# Patient Record
Sex: MALE | Race: WHITE | Employment: PART TIME | URBAN - METROPOLITAN AREA
[De-identification: names, ages, dates, MRNs, and addresses within clinical notes are randomized per-mention and may not be internally consistent; named-entity substitution may affect disease eponyms.]

---

## 2020-12-29 ENCOUNTER — HOSPITAL ENCOUNTER (EMERGENCY)
Age: 50
Discharge: HOME | End: 2020-12-29
Attending: EMERGENCY MEDICINE
Payer: COMMERCIAL

## 2020-12-29 VITALS
HEART RATE: 92 BPM | OXYGEN SATURATION: 96 % | SYSTOLIC BLOOD PRESSURE: 134 MMHG | WEIGHT: 240.08 LBS | TEMPERATURE: 98.6 F | HEIGHT: 71 IN | DIASTOLIC BLOOD PRESSURE: 97 MMHG | BODY MASS INDEX: 33.61 KG/M2 | RESPIRATION RATE: 20 BRPM

## 2020-12-29 PROCEDURE — 6370000000 HC RX 637 (ALT 250 FOR IP): Performed by: EMERGENCY MEDICINE

## 2020-12-29 PROCEDURE — 99283 EMERGENCY DEPT VISIT LOW MDM: CPT

## 2020-12-29 RX ORDER — GLIMEPIRIDE 4 MG/1
4 TABLET ORAL
Qty: 30 TABLET | Refills: 0 | Status: SHIPPED | OUTPATIENT
Start: 2020-12-29 | End: 2021-03-24

## 2020-12-29 RX ORDER — GLIMEPIRIDE 4 MG/1
4 TABLET ORAL ONCE
Status: COMPLETED | OUTPATIENT
Start: 2020-12-29 | End: 2020-12-29

## 2020-12-29 RX ADMIN — GLIMEPIRIDE 4 MG: 4 TABLET ORAL at 22:53

## 2020-12-29 NOTE — LETTER
[facility] Emergency Department  [address] [address]  Phone: [phone]  Fax: [phone]             December 29, 2020    Patient: Jian Rodriguez   YOB: 1970   Date of Visit: 12/29/2020       To Whom It May Concern:    Michele Zapien was seen and treated in our emergency department on 12/29/2020. He may return to work on 12/30/20. If you have any questions, don't hesitate to call. Thank you.        Sincerely,   Sheron ARENAS, RN          Signature:__________________________________

## 2020-12-30 NOTE — ED PROVIDER NOTES
EMERGENCY DEPARTMENT ENCOUNTER    Patient: Abi Mcdonough  MRN: [id number]  : 1970  Date of Evaluation: 2020  ED Provider:  Brandie King    CHIEF COMPLAINT  Chief Complaint   Patient presents with    Medication Refill     states out of glipizide, unable to get into contact with doctor       PATRICIO  Abi Mcdonough is a 45 y.o. male who presents stating he is out of his glimepiride. It had originally been listed in the triage note as glipizide however the patient states his glimepiride and states that is 4 mg. He does not have a written down with him however. We do not have any record of this medication either. He states he ran out of it 5 days ago. He states his primary care doctor is out of office for the week and he is unable to get the med refilled. He denies any symptoms at this present time. Denies any other associated symptoms or complaints or concerns.       REVIEW OF SYSTEMS    Constitutional: negative for fever, chills  Neurological: negative for HA, focal weakness, loss of sensation  Ophthalmic: negative for vision change  ENT: negative for congestion, rhinorrhea  Cardiovascular: negative for chest pain  Respiratory: negative for SOB, cough  GI: negative for abdominal pain, nausea, vomiting, diarrhea, constipation  : negative for dysuria, hematuria  Musculoskeletal: negative for myalgias, decreased ROM, joint swelling  Dermatological: negative for rash, wounds  Heme: Negative for bleeding, bruising      PAST MEDICAL HISTORY  Past Medical History:   Diagnosis Date    Diabetes ([facility] [address]     Thyroid disease     scheduled for thyroid bx  12r/t(  has a mass right side of neck)       CURRENT MEDICATIONS  [unfilled]    ALLERGIES  No Known Allergies    SURGICAL HISTORY  Past Surgical History:   Procedure Laterality Date    DENTAL SURGERY         FAMILY HISTORY  Family History   Problem Relation Age of Onset    Cancer Father         lung ca    Diabetes Sister     Blindness Sister  Heart Disease Mother     Diabetes Mother     Diabetes Brother        SOCIAL HISTORY  Social History     Socioeconomic History    Marital status: Single     Spouse name: None    Number of children: None    Years of education: None    Highest education level: None   Occupational History    None   Social Needs    Financial resource strain: None    Food insecurity     Worry: None     Inability: None    Transportation needs     Medical: None     Non-medical: None   Tobacco Use    Smoking status: Current Some Day Smoker     Packs/day: 0.50     Years: 23.00     Pack years: 11.50     Types: Cigarettes    Smokeless tobacco: Never Used   Substance and Sexual Activity    Alcohol use: No    Drug use: No     Comment: occ use of marijuana-last used as a teenager    Sexual activity: None   Lifestyle    Physical activity     Days per week: None     Minutes per session: None    Stress: None   Relationships    Social connections     Talks on phone: None     Gets together: None     Attends Restoration service: None     Active member of club or organization: None     Attends meetings of clubs or organizations: None     Relationship status: None    Intimate partner violence     Fear of current or ex partner: None     Emotionally abused: None     Physically abused: None     Forced sexual activity: None   Other Topics Concern    None   Social History Narrative    None         **Past medical, family and social histories, and nursing notes reviewed and verified by me**      PHYSICAL EXAM  VITAL SIGNS:   ED Triage Vitals [12/29/20 2134]   Enc Vitals Group      BP (!) 134/97      Pulse 92      Resp 20      Temp 98.6 °F (37 °C)      Temp Source Oral      SpO2 96 %      Weight       Height       Head Circumference       Peak Flow       Pain Score       Pain Loc       Pain Edu? Excl. in [address]? Vitals during ED course were reviewed and are as charted.     Constitutional: Minimal distress, Non-toxic appearance  Eyes: Conjunctiva normal, No discharge  HENT: Normocephalic, Atraumatic  Neck: Supple, no stridor, no grossly visible or palpable masses  Cardiovascular: Regular rate and rhythm, No murmurs, No rubs, No gallops  Pulmonary/Chest: Normal breath sounds, No respiratory distress or accessory muscle use, No wheezing, crackles or rhonchi. Abdomen: Soft, nondistended and nonrigid, No tenderness or peritoneal signs, No masses  Skin: Warm, Dry, No erythema, No rash, No cyanosis, No mottling  Psychiatric: Alert and oriented x3, Affect normal          RADIOLOGY/PROCEDURES/LABS/MEDICATIONS ADMINISTERED:    I have reviewed and interpreted all of the currently available lab results from this visit (if applicable):  No results found for this visit on 12/29/20. ABNORMAL LABS:  Labs Reviewed - No data to display      IMAGING STUDIES ORDERED:  None      No orders to display         MEDICATIONS ADMINISTERED:  Medications   glimepiride (AMARYL) tablet 4 mg (has no administration in time range)         COURSE & MEDICAL DECISION MAKING  Last vitals: BP (!) 134/97   Pulse 92   Temp 98.6 °F (37 °C) (Oral)   Resp 20   [id number]     70-year-old male who presents for refill of his oral antihyperglycemic medication. He did have some initial confusion as to which medication he is on however did later state that it was glimepiride 4 mg. Unfortunately did not have any records to confirm this. He denies any symptoms at present time. I will give him a short prescription for this and I have advised that he follow-up with his primary care provider next week. Additional workup and treatment in the ED as documented above. Patient reassured and will be discharged home. I have explained to the patient in appropriate terminology our work-up in the ED and their diagnosis. I have also given anticipatory guidance and expectant management of their condition as an outpatient as per my custom.  The patient was given clear discharge and follow-up instructions including return to the ER immediately for worsening concerns. The patient has been advised to follow-up with their primary care physician and/or referred physician in the next two to three days or sooner if worsening and to return to the ER immediately as above with any concerns. I provided the patient counseling with regard to my customary list of strict return precautions as well as return precautions specific to the cause for today's emergency department visit. The patient will return under these provided conditions, but should also return for new concerns or further worsening. Pt and/or family understand and agree with plan. Clinical Impression:  1. Encounter for medication refill    2. Type 2 diabetes mellitus without complication, unspecified whether long term insulin use ([facility] [address]        Disposition referral (if applicable):  Sharyle Meyers, MD  [address] [phone]    Schedule an appointment as soon as possible for a visit       [facility] Emergency Department  [address] [phone]  [phone]    If symptoms worsen      Disposition medications (if applicable):  New Prescriptions    GLIMEPIRIDE (AMARYL) 4 MG TABLET    Take 1 tablet by mouth every morning (before breakfast)       ED Provider Disposition Time  DISPOSITION Decision To Discharge 12/29/2020 10:15:43 PM          Electronically signed by: Xochilt Guzman M.D., 12/29/2020 10:20 PM      This dictation was created with voice recognition software. While attempts have been made to review the dictation as it is transcribed, on occasion the spoken word can be misinterpreted by the technology leading to omissions or inappropriate words, phrases or sentences.         Prashant Hussein MD  12/30/20 0664

## 2020-12-30 NOTE — ED NOTES
Patient medicated per orders at this time. Patient discharged to home at this time. Discharge instructions and follow up care discussed, patient voices understanding.       Fadia Bullock RN  12/29/20 4195

## 2021-03-24 ENCOUNTER — HOSPITAL ENCOUNTER (EMERGENCY)
Age: 51
Discharge: HOME | End: 2021-03-24
Attending: EMERGENCY MEDICINE
Payer: COMMERCIAL

## 2021-03-24 VITALS
HEART RATE: 104 BPM | WEIGHT: 255 LBS | BODY MASS INDEX: 35.7 KG/M2 | RESPIRATION RATE: 16 BRPM | DIASTOLIC BLOOD PRESSURE: 95 MMHG | OXYGEN SATURATION: 96 % | HEIGHT: 71 IN | SYSTOLIC BLOOD PRESSURE: 118 MMHG | TEMPERATURE: 98 F

## 2021-03-24 DIAGNOSIS — Z76.0 ENCOUNTER FOR MEDICATION REFILL: Primary | ICD-10-CM

## 2021-03-24 PROCEDURE — 99283 EMERGENCY DEPT VISIT LOW MDM: CPT

## 2021-03-24 RX ORDER — GLYBURIDE 2.5 MG/1
2.5 TABLET ORAL
Qty: 30 TABLET | Refills: 0 | Status: ON HOLD | OUTPATIENT
Start: 2021-03-24 | End: 2021-07-06 | Stop reason: SDUPTHER

## 2021-03-25 NOTE — ED NOTES
Dr Susy Alva saw patient in triage. Patient being registered and then will dc patient.       Rosena Schirmer, RN  03/24/21 3422

## 2021-03-26 NOTE — ED PROVIDER NOTES
Emergency Department Encounter    Patient: Dipti Patel  MRN: [id number]  : 1970  Date of Evaluation: 3/26/2021  ED Provider:  Juan Alberto Patient    Triage Chief Complaint:   Medication Refill (out of glyburide 2.5mg, having extreme thirst and urination )    [place]:  Dipti Patel is a 45 y.o. male that presents medication refill, he is out of his glyburide, having increased thirst due to that, sugars have been running a little high in the 200s but not extremely high.     ROS - see HPI, below listed is current ROS at time of my eval:  General:  No fevers, no chills  Eyes:  No recent vison changes, no discharge  ENT:  No sore throat, no nasal congestion  Cardiovascular:  No chest pain, no palpitations  Respiratory:  No shortness of breath, no cough  Gastrointestinal:  No pain, no nausea, no vomiting  Musculoskeletal:  No muscle pain, no joint pain  Neurologic:  No speech problems, no headache, no extremity weakness  Psychiatric:  No anxiety  Extremities:  no edema, no pain    Past Medical History:   Diagnosis Date    Diabetes ([facility] [address]     Thyroid disease     scheduled for thyroid bx  12r/t(  has a mass right side of neck)     Past Surgical History:   Procedure Laterality Date    DENTAL SURGERY       Family History   Problem Relation Age of Onset    Cancer Father         lung ca    Diabetes Sister     Blindness Sister     Heart Disease Mother     Diabetes Mother     Diabetes Brother      Social History     Socioeconomic History    Marital status: Single     Spouse name: Not on file    Number of children: Not on file    Years of education: Not on file    Highest education level: Not on file   Occupational History    Not on file   Social Needs    Financial resource strain: Not on file    Food insecurity     Worry: Not on file     Inability: Not on file    Transportation needs     Medical: Not on file     Non-medical: Not on file   Tobacco Use    Smoking status: Current Some Day Smoker Packs/day: 0.50     Years: 23.00     Pack years: 11.50     Types: Cigarettes    Smokeless tobacco: Never Used   Substance and Sexual Activity    Alcohol use: No    Drug use: No     Comment: occ use of marijuana-last used as a teenager    Sexual activity: Not on file   Lifestyle    Physical activity     Days per week: Not on file     Minutes per session: Not on file    Stress: Not on file   Relationships    Social connections     Talks on phone: Not on file     Gets together: Not on file     Attends Buddhist service: Not on file     Active member of club or organization: Not on file     Attends meetings of clubs or organizations: Not on file     Relationship status: Not on file    Intimate partner violence     Fear of current or ex partner: Not on file     Emotionally abused: Not on file     Physically abused: Not on file     Forced sexual activity: Not on file   Other Topics Concern    Not on file   Social History Narrative    Not on file     No current facility-administered medications for this encounter. Current Outpatient Medications   Medication Sig Dispense Refill    glyBURIDE (DIABETA) 2.5 MG tablet Take 1 tablet by mouth daily (with breakfast) 30 tablet 0    metFORMIN (GLUCOPHAGE) 500 MG tablet Take 1 tablet by mouth 2 times daily (with meals) 30 tablet 0     No Known Allergies    Nursing Notes Reviewed    Physical Exam:  Triage VS:    ED Triage Vitals [03/24/21 2144]   Enc Vitals Group      BP (!) 118/95      Pulse 104      Resp 16      Temp 98 °F (36.7 °C)      Temp Source Oral      SpO2 96 %      Weight 255 lb (115.7 kg)      Height 5' 11\" (1.803 m)      Head Circumference       Peak Flow       Pain Score       Pain Loc       Pain Edu? Excl. in [address]? My pulse ox interpretation is  normal    General appearance:  No acute distress. Eye:  Extraocular movements intact. Ears, nose, mouth and throat:  Oral mucosa moist   Neck:  Trachea midline.    Extremity:  Normal ROM Heart:  Regular  Perfusion:  intact  Respiratory:   Respirations nonlabored. Abdominal: Non distended. Neurological:  Alert and oriented    MDM:  Patient presented to the emergency department for medication refill, patient is not having any symptoms related to being off the medications, and has no acute complaints. At this point prescriptions will be provided, I did recommend finding an outpatient primary care physician which patient is working on, outpatient follow-up instructions given, return warnings given. Clinical Impression:  1. Encounter for medication refill      Disposition referral (if applicable):  [facility] [place]  [address] [address] [facility]  [phone]  In 2 days      Disposition medications (if applicable):  Discharge Medication List as of 3/24/2021 10:02 PM      START taking these medications    Details   glyBURIDE (DIABETA) 2.5 MG tablet Take 1 tablet by mouth daily (with breakfast), Disp-30 tablet, R-0Normal           ED Provider Disposition Time  DISPOSITION Decision To Discharge 03/24/2021 09:53:17 PM      Comment: Please note this report has been produced using speech recognition software and may contain errors related to that system including errors in grammar, punctuation, and spelling, as well as words and phrases that may be inappropriate. Efforts were made to edit the dictations.        David Lopez MD  03/26/21 2314

## 2021-07-04 ENCOUNTER — HOSPITAL ENCOUNTER (INPATIENT)
Age: 51
LOS: 1 days | Discharge: HOME | DRG: 639 | End: 2021-07-06
Attending: STUDENT IN AN ORGANIZED HEALTH CARE EDUCATION/TRAINING PROGRAM | Admitting: STUDENT IN AN ORGANIZED HEALTH CARE EDUCATION/TRAINING PROGRAM
Payer: COMMERCIAL

## 2021-07-04 DIAGNOSIS — E11.65 TYPE 2 DIABETES MELLITUS WITH HYPERGLYCEMIA, WITHOUT LONG-TERM CURRENT USE OF INSULIN (HCC): Primary | ICD-10-CM

## 2021-07-04 LAB
ALBUMIN SERPL-MCNC: 4.2 GM/DL (ref 3.4–5)
ALP BLD-CCNC: 153 IU/L (ref 40–129)
ALT SERPL-CCNC: 59 U/L (ref 10–40)
ANION GAP SERPL CALCULATED.3IONS-SCNC: 15 MMOL/L (ref 4–16)
AST SERPL-CCNC: 26 IU/L (ref 15–37)
BASE EXCESS: 4 (ref 0–3.3)
BASOPHILS ABSOLUTE: 0 K/CU MM
BASOPHILS RELATIVE PERCENT: 0.6 % (ref 0–1)
BETA-HYDROXYBUTYRATE: 1.7 MG/DL (ref 0–3)
BILIRUB SERPL-MCNC: 0.6 MG/DL (ref 0–1)
BUN BLDV-MCNC: 13 MG/DL (ref 6–23)
CALCIUM SERPL-MCNC: 8.8 MG/DL (ref 8.3–10.6)
CHLORIDE BLD-SCNC: 86 MMOL/L (ref 99–110)
CHP ED QC CHECK: YES
CO2: 20 MMOL/L (ref 21–32)
COMMENT: ABNORMAL
CREAT SERPL-MCNC: 0.8 MG/DL (ref 0.9–1.3)
DIFFERENTIAL TYPE: ABNORMAL
EOSINOPHILS ABSOLUTE: 0.1 K/CU MM
EOSINOPHILS RELATIVE PERCENT: 1.6 % (ref 0–3)
GFR AFRICAN AMERICAN: >60 ML/MIN/1.73M2
GFR NON-AFRICAN AMERICAN: >60 ML/MIN/1.73M2
GLUCOSE BLD-MCNC: 847 MG/DL (ref 70–99)
GLUCOSE BLD-MCNC: >600 MG/DL (ref 70–99)
GLUCOSE BLD-MCNC: NORMAL MG/DL
HCO3 VENOUS: 20 MMOL/L (ref 19–25)
HCT VFR BLD CALC: 41.1 % (ref 42–52)
HEMOGLOBIN: 14.8 GM/DL (ref 13.5–18)
IMMATURE NEUTROPHIL %: 0.3 % (ref 0–0.43)
LYMPHOCYTES ABSOLUTE: 1.5 K/CU MM
LYMPHOCYTES RELATIVE PERCENT: 21.8 % (ref 24–44)
MCH RBC QN AUTO: 30.2 PG (ref 27–31)
MCHC RBC AUTO-ENTMCNC: 36 % (ref 32–36)
MCV RBC AUTO: 83.9 FL (ref 78–100)
MONOCYTES ABSOLUTE: 0.3 K/CU MM
MONOCYTES RELATIVE PERCENT: 4.9 % (ref 0–4)
NUCLEATED RBC %: 0 %
O2 SAT, VEN: 94 % (ref 50–70)
PCO2, VEN: 33 MMHG (ref 38–52)
PDW BLD-RTO: 11.9 % (ref 11.7–14.9)
PH VENOUS: 7.39 (ref 7.32–7.42)
PLATELET # BLD: 223 K/CU MM (ref 140–440)
PMV BLD AUTO: 10.2 FL (ref 7.5–11.1)
PO2, VEN: 89 MMHG (ref 28–48)
POTASSIUM SERPL-SCNC: 4.3 MMOL/L (ref 3.5–5.1)
RBC # BLD: 4.9 M/CU MM (ref 4.6–6.2)
SEGMENTED NEUTROPHILS ABSOLUTE COUNT: 4.9 K/CU MM
SEGMENTED NEUTROPHILS RELATIVE PERCENT: 70.8 % (ref 36–66)
SODIUM BLD-SCNC: 121 MMOL/L (ref 135–145)
TOTAL IMMATURE NEUTOROPHIL: 0.02 K/CU MM
TOTAL NUCLEATED RBC: 0 K/CU MM
TOTAL PROTEIN: 6.8 GM/DL (ref 6.4–8.2)
WBC # BLD: 6.9 K/CU MM (ref 4–10.5)

## 2021-07-04 PROCEDURE — 81001 URINALYSIS AUTO W/SCOPE: CPT

## 2021-07-04 PROCEDURE — 2580000003 HC RX 258: Performed by: PHYSICIAN ASSISTANT

## 2021-07-04 PROCEDURE — 85025 COMPLETE CBC W/AUTO DIFF WBC: CPT

## 2021-07-04 PROCEDURE — 96360 HYDRATION IV INFUSION INIT: CPT

## 2021-07-04 PROCEDURE — 82010 KETONE BODYS QUAN: CPT

## 2021-07-04 PROCEDURE — 80053 COMPREHEN METABOLIC PANEL: CPT

## 2021-07-04 PROCEDURE — 99285 EMERGENCY DEPT VISIT HI MDM: CPT

## 2021-07-04 PROCEDURE — 96361 HYDRATE IV INFUSION ADD-ON: CPT

## 2021-07-04 PROCEDURE — 82805 BLOOD GASES W/O2 SATURATION: CPT

## 2021-07-04 RX ORDER — 0.9 % SODIUM CHLORIDE 0.9 %
1000 INTRAVENOUS SOLUTION INTRAVENOUS ONCE
Status: COMPLETED | OUTPATIENT
Start: 2021-07-04 | End: 2021-07-04

## 2021-07-04 RX ORDER — 0.9 % SODIUM CHLORIDE 0.9 %
1000 INTRAVENOUS SOLUTION INTRAVENOUS ONCE
Status: COMPLETED | OUTPATIENT
Start: 2021-07-04 | End: 2021-07-05

## 2021-07-04 RX ADMIN — SODIUM CHLORIDE 1000 ML: 9 INJECTION, SOLUTION INTRAVENOUS at 23:37

## 2021-07-04 RX ADMIN — SODIUM CHLORIDE 1000 ML: 9 INJECTION, SOLUTION INTRAVENOUS at 22:45

## 2021-07-05 PROBLEM — E11.00 HYPEROSMOLAR HYPERGLYCEMIC STATE (HHS) (HCC): Status: ACTIVE | Noted: 2021-07-05

## 2021-07-05 LAB
ANION GAP SERPL CALCULATED.3IONS-SCNC: 11 MMOL/L (ref 4–16)
ANION GAP SERPL CALCULATED.3IONS-SCNC: 12 MMOL/L (ref 4–16)
BACTERIA: NEGATIVE /HPF
BILIRUBIN URINE: NEGATIVE MG/DL
BLOOD, URINE: NEGATIVE
BUN BLDV-MCNC: 10 MG/DL (ref 6–23)
BUN BLDV-MCNC: 10 MG/DL (ref 6–23)
CALCIUM SERPL-MCNC: 8.1 MG/DL (ref 8.3–10.6)
CALCIUM SERPL-MCNC: 8.3 MG/DL (ref 8.3–10.6)
CHLORIDE BLD-SCNC: 103 MMOL/L (ref 99–110)
CHLORIDE BLD-SCNC: 103 MMOL/L (ref 99–110)
CHP ED QC CHECK: NORMAL
CHP ED QC CHECK: NORMAL
CHP ED QC CHECK: YES
CLARITY: CLEAR
CO2: 22 MMOL/L (ref 21–32)
CO2: 23 MMOL/L (ref 21–32)
COLOR: COLORLESS
CREAT SERPL-MCNC: 0.6 MG/DL (ref 0.9–1.3)
CREAT SERPL-MCNC: 0.6 MG/DL (ref 0.9–1.3)
ESTIMATED AVERAGE GLUCOSE: 413 MG/DL
GFR AFRICAN AMERICAN: >60 ML/MIN/1.73M2
GFR AFRICAN AMERICAN: >60 ML/MIN/1.73M2
GFR NON-AFRICAN AMERICAN: >60 ML/MIN/1.73M2
GFR NON-AFRICAN AMERICAN: >60 ML/MIN/1.73M2
GLUCOSE BLD-MCNC: 233 MG/DL
GLUCOSE BLD-MCNC: 233 MG/DL (ref 70–99)
GLUCOSE BLD-MCNC: 249 MG/DL (ref 70–99)
GLUCOSE BLD-MCNC: 250 MG/DL (ref 70–99)
GLUCOSE BLD-MCNC: 252 MG/DL
GLUCOSE BLD-MCNC: 252 MG/DL (ref 70–99)
GLUCOSE BLD-MCNC: 257 MG/DL (ref 70–99)
GLUCOSE BLD-MCNC: 261 MG/DL (ref 70–99)
GLUCOSE BLD-MCNC: 285 MG/DL
GLUCOSE BLD-MCNC: 285 MG/DL
GLUCOSE BLD-MCNC: 285 MG/DL (ref 70–99)
GLUCOSE BLD-MCNC: 299 MG/DL (ref 70–99)
GLUCOSE BLD-MCNC: 317 MG/DL
GLUCOSE BLD-MCNC: 317 MG/DL (ref 70–99)
GLUCOSE BLD-MCNC: 333 MG/DL
GLUCOSE BLD-MCNC: 333 MG/DL (ref 70–99)
GLUCOSE BLD-MCNC: 350 MG/DL (ref 70–99)
GLUCOSE BLD-MCNC: 484 MG/DL
GLUCOSE BLD-MCNC: 484 MG/DL (ref 70–99)
GLUCOSE, URINE: >500 MG/DL
HBA1C MFR BLD: 16 % (ref 4.2–6.3)
HCT VFR BLD CALC: 39.3 % (ref 42–52)
HEMOGLOBIN: 14 GM/DL (ref 13.5–18)
KETONES, URINE: NEGATIVE MG/DL
LEUKOCYTE ESTERASE, URINE: NEGATIVE
MAGNESIUM: 1.7 MG/DL (ref 1.8–2.4)
MCH RBC QN AUTO: 30.2 PG (ref 27–31)
MCHC RBC AUTO-ENTMCNC: 35.6 % (ref 32–36)
MCV RBC AUTO: 84.9 FL (ref 78–100)
MUCUS: ABNORMAL HPF
NITRITE URINE, QUANTITATIVE: NEGATIVE
PDW BLD-RTO: 12.2 % (ref 11.7–14.9)
PH, URINE: 5 (ref 5–8)
PHOSPHORUS: 2.5 MG/DL (ref 2.5–4.9)
PLATELET # BLD: 215 K/CU MM (ref 140–440)
PMV BLD AUTO: 10.4 FL (ref 7.5–11.1)
POTASSIUM SERPL-SCNC: 3.8 MMOL/L (ref 3.5–5.1)
POTASSIUM SERPL-SCNC: 3.8 MMOL/L (ref 3.5–5.1)
PROTEIN UA: NEGATIVE MG/DL
RBC # BLD: 4.63 M/CU MM (ref 4.6–6.2)
RBC URINE: ABNORMAL /HPF (ref 0–3)
SODIUM BLD-SCNC: 137 MMOL/L (ref 135–145)
SODIUM BLD-SCNC: 137 MMOL/L (ref 135–145)
SPECIFIC GRAVITY UA: 1.03 (ref 1–1.03)
SQUAMOUS EPITHELIAL: <1 /HPF
TRICHOMONAS: ABNORMAL /HPF
UROBILINOGEN, URINE: NEGATIVE MG/DL (ref 0.2–1)
WBC # BLD: 6.4 K/CU MM (ref 4–10.5)
WBC UA: ABNORMAL /HPF (ref 0–2)

## 2021-07-05 PROCEDURE — 96361 HYDRATE IV INFUSION ADD-ON: CPT

## 2021-07-05 PROCEDURE — 2580000003 HC RX 258: Performed by: STUDENT IN AN ORGANIZED HEALTH CARE EDUCATION/TRAINING PROGRAM

## 2021-07-05 PROCEDURE — 84100 ASSAY OF PHOSPHORUS: CPT

## 2021-07-05 PROCEDURE — 83036 HEMOGLOBIN GLYCOSYLATED A1C: CPT

## 2021-07-05 PROCEDURE — 94761 N-INVAS EAR/PLS OXIMETRY MLT: CPT

## 2021-07-05 PROCEDURE — 6370000000 HC RX 637 (ALT 250 FOR IP): Performed by: STUDENT IN AN ORGANIZED HEALTH CARE EDUCATION/TRAINING PROGRAM

## 2021-07-05 PROCEDURE — 6360000002 HC RX W HCPCS: Performed by: STUDENT IN AN ORGANIZED HEALTH CARE EDUCATION/TRAINING PROGRAM

## 2021-07-05 PROCEDURE — 96366 THER/PROPH/DIAG IV INF ADDON: CPT

## 2021-07-05 PROCEDURE — 36415 COLL VENOUS BLD VENIPUNCTURE: CPT

## 2021-07-05 PROCEDURE — 80048 BASIC METABOLIC PNL TOTAL CA: CPT

## 2021-07-05 PROCEDURE — 1200000000 HC SEMI PRIVATE

## 2021-07-05 PROCEDURE — G0378 HOSPITAL OBSERVATION PER HR: HCPCS

## 2021-07-05 PROCEDURE — 96375 TX/PRO/DX INJ NEW DRUG ADDON: CPT

## 2021-07-05 PROCEDURE — 82962 GLUCOSE BLOOD TEST: CPT

## 2021-07-05 PROCEDURE — 83735 ASSAY OF MAGNESIUM: CPT

## 2021-07-05 PROCEDURE — 96372 THER/PROPH/DIAG INJ SC/IM: CPT

## 2021-07-05 PROCEDURE — 85027 COMPLETE CBC AUTOMATED: CPT

## 2021-07-05 PROCEDURE — 6370000000 HC RX 637 (ALT 250 FOR IP): Performed by: INTERNAL MEDICINE

## 2021-07-05 PROCEDURE — 6360000002 HC RX W HCPCS: Performed by: INTERNAL MEDICINE

## 2021-07-05 PROCEDURE — 96365 THER/PROPH/DIAG IV INF INIT: CPT

## 2021-07-05 RX ORDER — SODIUM CHLORIDE 9 MG/ML
INJECTION, SOLUTION INTRAVENOUS CONTINUOUS
Status: CANCELLED | OUTPATIENT
Start: 2021-07-05

## 2021-07-05 RX ORDER — POTASSIUM CHLORIDE 7.45 MG/ML
10 INJECTION INTRAVENOUS PRN
Status: DISCONTINUED | OUTPATIENT
Start: 2021-07-05 | End: 2021-07-06 | Stop reason: HOSPADM

## 2021-07-05 RX ORDER — 0.9 % SODIUM CHLORIDE 0.9 %
15 INTRAVENOUS SOLUTION INTRAVENOUS ONCE
Status: CANCELLED | OUTPATIENT
Start: 2021-07-05

## 2021-07-05 RX ORDER — DEXTROSE MONOHYDRATE 25 G/50ML
12.5 INJECTION, SOLUTION INTRAVENOUS PRN
Status: DISCONTINUED | OUTPATIENT
Start: 2021-07-05 | End: 2021-07-06 | Stop reason: HOSPADM

## 2021-07-05 RX ORDER — DEXTROSE AND SODIUM CHLORIDE 5; .45 G/100ML; G/100ML
INJECTION, SOLUTION INTRAVENOUS CONTINUOUS PRN
Status: CANCELLED | OUTPATIENT
Start: 2021-07-05

## 2021-07-05 RX ORDER — MAGNESIUM SULFATE 1 G/100ML
1000 INJECTION INTRAVENOUS PRN
Status: DISCONTINUED | OUTPATIENT
Start: 2021-07-05 | End: 2021-07-06 | Stop reason: HOSPADM

## 2021-07-05 RX ORDER — DEXTROSE MONOHYDRATE 25 G/50ML
12.5 INJECTION, SOLUTION INTRAVENOUS PRN
Status: DISCONTINUED | OUTPATIENT
Start: 2021-07-05 | End: 2021-07-05

## 2021-07-05 RX ORDER — POLYETHYLENE GLYCOL 3350 17 G/17G
17 POWDER, FOR SOLUTION ORAL DAILY PRN
Status: DISCONTINUED | OUTPATIENT
Start: 2021-07-05 | End: 2021-07-06 | Stop reason: HOSPADM

## 2021-07-05 RX ORDER — DEXTROSE MONOHYDRATE 50 MG/ML
100 INJECTION, SOLUTION INTRAVENOUS PRN
Status: DISCONTINUED | OUTPATIENT
Start: 2021-07-05 | End: 2021-07-05

## 2021-07-05 RX ORDER — INSULIN GLARGINE 100 [IU]/ML
25 INJECTION, SOLUTION SUBCUTANEOUS NIGHTLY
Status: DISCONTINUED | OUTPATIENT
Start: 2021-07-05 | End: 2021-07-06

## 2021-07-05 RX ORDER — NICOTINE 21 MG/24HR
1 PATCH, TRANSDERMAL 24 HOURS TRANSDERMAL DAILY
Status: DISCONTINUED | OUTPATIENT
Start: 2021-07-05 | End: 2021-07-06 | Stop reason: HOSPADM

## 2021-07-05 RX ORDER — MAGNESIUM SULFATE IN WATER 40 MG/ML
2000 INJECTION, SOLUTION INTRAVENOUS ONCE
Status: COMPLETED | OUTPATIENT
Start: 2021-07-05 | End: 2021-07-05

## 2021-07-05 RX ORDER — SODIUM CHLORIDE, SODIUM LACTATE, POTASSIUM CHLORIDE, CALCIUM CHLORIDE 600; 310; 30; 20 MG/100ML; MG/100ML; MG/100ML; MG/100ML
INJECTION, SOLUTION INTRAVENOUS CONTINUOUS
Status: ACTIVE | OUTPATIENT
Start: 2021-07-05 | End: 2021-07-05

## 2021-07-05 RX ORDER — NICOTINE POLACRILEX 4 MG
15 LOZENGE BUCCAL PRN
Status: DISCONTINUED | OUTPATIENT
Start: 2021-07-05 | End: 2021-07-06 | Stop reason: HOSPADM

## 2021-07-05 RX ORDER — DEXTROSE MONOHYDRATE 50 MG/ML
100 INJECTION, SOLUTION INTRAVENOUS PRN
Status: DISCONTINUED | OUTPATIENT
Start: 2021-07-05 | End: 2021-07-06 | Stop reason: HOSPADM

## 2021-07-05 RX ORDER — NICOTINE POLACRILEX 4 MG
15 LOZENGE BUCCAL PRN
Status: DISCONTINUED | OUTPATIENT
Start: 2021-07-05 | End: 2021-07-05

## 2021-07-05 RX ADMIN — MAGNESIUM SULFATE HEPTAHYDRATE 2000 MG: 40 INJECTION, SOLUTION INTRAVENOUS at 08:55

## 2021-07-05 RX ADMIN — INSULIN LISPRO 2 UNITS: 100 INJECTION, SOLUTION INTRAVENOUS; SUBCUTANEOUS at 14:53

## 2021-07-05 RX ADMIN — INSULIN LISPRO 3 UNITS: 100 INJECTION, SOLUTION INTRAVENOUS; SUBCUTANEOUS at 22:35

## 2021-07-05 RX ADMIN — INSULIN LISPRO 3 UNITS: 100 INJECTION, SOLUTION INTRAVENOUS; SUBCUTANEOUS at 08:46

## 2021-07-05 RX ADMIN — SODIUM CHLORIDE, POTASSIUM CHLORIDE, SODIUM LACTATE AND CALCIUM CHLORIDE: 600; 310; 30; 20 INJECTION, SOLUTION INTRAVENOUS at 06:53

## 2021-07-05 RX ADMIN — SODIUM CHLORIDE, POTASSIUM CHLORIDE, SODIUM LACTATE AND CALCIUM CHLORIDE: 600; 310; 30; 20 INJECTION, SOLUTION INTRAVENOUS at 02:29

## 2021-07-05 RX ADMIN — INSULIN LISPRO 4 UNITS: 100 INJECTION, SOLUTION INTRAVENOUS; SUBCUTANEOUS at 05:10

## 2021-07-05 RX ADMIN — INSULIN GLARGINE 25 UNITS: 100 INJECTION, SOLUTION SUBCUTANEOUS at 23:41

## 2021-07-05 RX ADMIN — ENOXAPARIN SODIUM 40 MG: 40 INJECTION SUBCUTANEOUS at 09:22

## 2021-07-05 RX ADMIN — INSULIN HUMAN 8 UNITS: 100 INJECTION, SOLUTION PARENTERAL at 02:31

## 2021-07-05 RX ADMIN — INSULIN LISPRO 3 UNITS: 100 INJECTION, SOLUTION INTRAVENOUS; SUBCUTANEOUS at 18:03

## 2021-07-05 ASSESSMENT — ENCOUNTER SYMPTOMS
SINUS PAIN: 0
SORE THROAT: 0
ANAL BLEEDING: 0
DIARRHEA: 0
COLOR CHANGE: 0
COUGH: 0
CHEST TIGHTNESS: 0
SHORTNESS OF BREATH: 0
SINUS PRESSURE: 0
CONSTIPATION: 0
BLOOD IN STOOL: 0
VOMITING: 0
WHEEZING: 0
ABDOMINAL PAIN: 0
RHINORRHEA: 0
NAUSEA: 0

## 2021-07-05 ASSESSMENT — PAIN SCALES - GENERAL: PAINLEVEL_OUTOF10: 0

## 2021-07-05 NOTE — ED NOTES
The patient is sleeping and is easily arousable. He is updated on his plan of care and room number.       Trinity Herrera RN  07/05/21 8118

## 2021-07-05 NOTE — PROGRESS NOTES
Patient seen and examined at bedside. Resting comfortably with no complaints. Sugars improved. No abdominal pain, nausea or vomiting. Will continue SSI, IVF, and appreciate Endocrinology recs. Will continue trending BMP for pseudohyponatremia in setting of hyperglycemia.

## 2021-07-05 NOTE — ED PROVIDER NOTES
EMERGENCY DEPARTMENT ENCOUNTER      PCP: No primary care provider on file. CHIEF COMPLAINT    Chief Complaint   Patient presents with    Medication Refill     Out of glyburide     This patient was not evaluated by the attending physician. I have independently evaluated this patient. My supervising physician was available for consultation. PATRICIO Nguyễn is a 45 y.o. male with PMH of type 2 DM who presents for medication refill of glyburide. Patient takes 2.5 mg of glyburide daily but has been out of this medication for the past 2 weeks. Since being out of this medication for the past 2 weeks he has gradually developed increased thirst and increased urination as well as dry mouth. Duration of symptoms have been constant since onset. No aggravating or alleviating factors. Patient reports he has been drinking a lot of fluids without improvement in his dry mouth and without improvement in his level of thirst.  Patient reports he has not checked his blood glucose at home since September 2020. He reports that he still is taking his Metformin daily but is out of his glyburide because he is trying to switch primary care physicians but has been unable to schedule an appointment with a new PCP as his phone will not dial out. Patient reports he has never been on insulin before. Patient denies recent illness, fever, chills, chest pain, shortness of breath, abdominal pain nausea, vomiting. REVIEW OF SYSTEMS    Constitutional:  Denies fever, chills  HENT:  Denies sore throat or ear pain   Cardiovascular:  Denies chest pain, palpitations   Respiratory:  Denies cough or shortness of breath    GI:  Denies abdominal pain, nausea, vomiting, or diarrhea  : + urinary frequency;  Denies dysuria, hematuria, urinary urgency  Musculoskeletal:  Denies back pain  Skin:  Denies rash  Neurologic:  Denies headache, focal weakness or sensory changes   Endocrine:  + polyuria and polydypsia   Lymphatic:  Denies swollen glands     All other review of systems are negative  See HPI and nursing notes for additional information     PAST MEDICAL AND SURGICAL HISTORY    Past Medical History:   Diagnosis Date    Diabetes ([facility] [address]     Thyroid disease     scheduled for thyroid bx  8/20/12r/t(  has a mass right side of neck)     Past Surgical History:   Procedure Laterality Date    DENTAL SURGERY         CURRENT MEDICATIONS    Current Outpatient Rx   Medication Sig Dispense Refill    glyBURIDE (DIABETA) 2.5 MG tablet Take 1 tablet by mouth daily (with breakfast) 30 tablet 0    metFORMIN (GLUCOPHAGE) 500 MG tablet Take 1 tablet by mouth 2 times daily (with meals) 30 tablet 0       ALLERGIES    No Known Allergies    SOCIAL AND FAMILY HISTORY    Social History     Socioeconomic History    Marital status: Single     Spouse name: None    Number of children: None    Years of education: None    Highest education level: None   Occupational History    None   Tobacco Use    Smoking status: Current Some Day Smoker     Packs/day: 0.50     Years: 23.00     Pack years: 11.50     Types: Cigarettes    Smokeless tobacco: Never Used   Vaping Use    Vaping Use: Never used   Substance and Sexual Activity    Alcohol use: No    Drug use: No     Comment: occ use of marijuana-last used as a teenager    Sexual activity: None   Other Topics Concern    None   Social History Narrative    None     Social Determinants of Health     Financial Resource Strain:     Difficulty of Paying Living Expenses:    Food Insecurity:     Worried About Running Out of Food in the Last Year:     Ran Out of Food in the Last Year:    Transportation Needs:     Lack of Transportation (Medical):      Lack of Transportation (Non-Medical):    Physical Activity:     Days of Exercise per Week:     Minutes of Exercise per Session:    Stress:     Feeling of Stress :    Social Connections:     Frequency of Communication with Friends and Family:     Frequency of Social Gatherings with Friends and Family:     Attends Buddhism Services:     Active Member of Clubs or Organizations:     Attends Club or Organization Meetings:     Marital Status:    Intimate Partner Violence:     Fear of Current or Ex-Partner:     Emotionally Abused:     Physically Abused:     Sexually Abused:      Family History   Problem Relation Age of Onset    Cancer Father         lung ca    Diabetes Sister     Blindness Sister     Heart Disease Mother     Diabetes Mother     Diabetes Brother          PHYSICAL EXAM    VITAL SIGNS: BP (!) 113/90   Pulse 83   Temp 97.9 °F (36.6 °C) (Oral)   Resp 23   Wt 255 lb 1.2 oz (115.7 kg)   SpO2 97%   BMI 35.58 kg/m²    Constitutional:  Well developed, Well nourished. No distress  HENT:  Normocephalic, Atraumatic. EOMI. Sclera clear. Conjunctiva normal, No discharge. Mucous membranes are tacky. Neck/Lymphatics: supple, no JVD, no swollen nodes  Cardiovascular:  RRR, no murmurs/rubs/gallops. Respiratory:  Nonlabored breathing. Normal breath sounds, No wheezing  Abdomen: Bowel sounds normal, Soft, No tenderness, no masses. Musculoskeletal:    There is no edema, asymmetry, or calf / thigh tenderness bilaterally. No cyanosis. No cool or pale-appearing limb. Distal cap refill and pulses intact bilateral upper and lower extremities  Bilateral upper and lower extremity ROM intact without pain or obvious deficit  Integument:  Warm, Dry  Neurologic: Alert & oriented , No focal deficits noted. Cranial nerves II through XII grossly intact. Normal gross motor coordination & motor strength bilateral upper and lower extremities  Sensation intact.   Psychiatric:  Affect normal, Mood normal.       Labs:  Results for orders placed or performed during the hospital encounter of 07/04/21   CBC Auto Differential   Result Value Ref Range    WBC 6.9 4.0 - 10.5 K/CU MM    RBC 4.90 4.6 - 6.2 M/CU MM    Hemoglobin 14.8 13.5 - 18.0 GM/DL    Hematocrit 41.1 (L) 42 - 52 %    MCV 83.9 78 - 100 FL    MCH 30.2 27 - 31 PG    MCHC 36.0 32.0 - 36.0 %    RDW 11.9 11.7 - 14.9 %    Platelets 054 597 - 520 K/CU MM    MPV 10.2 7.5 - 11.1 FL    Differential Type AUTOMATED DIFFERENTIAL     Segs Relative 70.8 (H) 36 - 66 %    Lymphocytes % 21.8 (L) 24 - 44 %    Monocytes % 4.9 (H) 0 - 4 %    Eosinophils % 1.6 0 - 3 %    Basophils % 0.6 0 - 1 %    Segs Absolute 4.9 K/CU MM    Lymphocytes Absolute 1.5 K/CU MM    Monocytes Absolute 0.3 K/CU MM    Eosinophils Absolute 0.1 K/CU MM    Basophils Absolute 0.0 K/CU MM    Nucleated RBC % 0.0 %    Total Nucleated RBC 0.0 K/CU MM    Total Immature Neutrophil 0.02 K/CU MM    Immature Neutrophil % 0.3 0 - 0.43 %   Comprehensive Metabolic Panel   Result Value Ref Range    Sodium 121 (L) 135 - 145 MMOL/L    Potassium 4.3 3.5 - 5.1 MMOL/L    Chloride 86 (L) 99 - 110 mMol/L    CO2 20 (L) 21 - 32 MMOL/L    BUN 13 6 - 23 MG/DL    CREATININE 0.8 (L) 0.9 - 1.3 MG/DL    Glucose 847 (HH) 70 - 99 MG/DL    Calcium 8.8 8.3 - 10.6 MG/DL    Albumin 4.2 3.4 - 5.0 GM/DL    Total Protein 6.8 6.4 - 8.2 GM/DL    Total Bilirubin 0.6 0.0 - 1.0 MG/DL    ALT 59 (H) 10 - 40 U/L    AST 26 15 - 37 IU/L    Alkaline Phosphatase 153 (H) 40 - 129 IU/L    GFR Non-African American >60 >60 mL/min/1.73m2    GFR African American >60 >60 mL/min/1.73m2    Anion Gap 15 4 - 16   Beta-Hydroxybutyrate   Result Value Ref Range    Beta-Hydroxybutyrate 1.7 0.0 - 3.0 MG/DL   Blood Gas, Venous   Result Value Ref Range    pH, Nicholas 7.39 7.32 - 7.42    pCO2, Nicholas 33 (L) 38 - 52 mmHG    pO2, Nicholas 89 (H) 28 - 48 mmHG    Base Excess 4 (H) 0 - 3.3    HCO3, Venous 20.0 19 - 25 MMOL/L    O2 Sat, Nicholas 94.0 (H) 50 - 70 %    Comment VBG    Urinalysis   Result Value Ref Range    Color, UA COLORLESS (A) YELLOW    Clarity, UA CLEAR CLEAR    Glucose, Urine >500 (A) NEGATIVE MG/DL    Bilirubin Urine NEGATIVE NEGATIVE MG/DL    Ketones, Urine NEGATIVE NEGATIVE MG/DL    Specific Gravity, UA 1.027 1.001 - 1.035    Blood, Urine NEGATIVE NEGATIVE    pH, Urine 5.0 5.0 - 8.0    Protein, UA NEGATIVE NEGATIVE MG/DL    Urobilinogen, Urine NEGATIVE 0.2 - 1.0 MG/DL    Nitrite Urine, Quantitative NEGATIVE NEGATIVE    Leukocyte Esterase, Urine NEGATIVE NEGATIVE    RBC, UA NONE SEEN 0 - 3 /HPF    WBC, UA NONE SEEN 0 - 2 /HPF    Bacteria, UA NEGATIVE NEGATIVE /HPF    Squam Epithel, UA <1 /HPF    Mucus, UA OCCASIONAL (A) NEGATIVE HPF    Trichomonas, UA NONE SEEN NONE SEEN /HPF   POC Blood Glucose   Result Value Ref Range    Glucose  mg/dL    QC OK? yes    POCT Glucose   Result Value Ref Range    Glucose 484 mg/dL    QC OK? yes            ED COURSE & MEDICAL DECISION MAKING       Vital signs and nursing notes reviewed during ED course. I have independently evaluated this patient. Supervising physician present in the Emergency Department, available for consultation, throughout entirety of patient care. Patient presents as above initially just requesting medication refill of his glyburide. Point-of-care glucose was obtained and meter just read high. Given patient's polydipsia and polyuria after being off his glyburide for 2 weeks further work-up was pursued and labs were obtained. Labs reveal significant hyperglycemia with blood glucose of 847, pseudohyponatremia of 121 that corrects to 133 when accounting for hyperglycemia, bicarb of 20 on CMP, beta hydroxybutyrate is normal, no ketones present in urine. No leukocytosis or leukopenia. No acidosis on venous pH. given patient's hyperglycemia as well as polydipsia and polyuria I do have concern for HHS and will admit patient to hospitalist for further evaluation and care. I consulted with hospitalist, Dr. Edyta Gonzalez , we discussed the patient's case including presentation, labs and concern for HHS. He recommends starting the patient on DKA protocol with insulin infusion.   Insulin infusion ordered, but repeat point-of-care glucose was obtained after patient hydrated with 2 L of normal saline IV fluids and blood glucose has improved to 484. Jimenez RUIZ [facility] to make him aware of patient's improved hyperglycemia to 44 and to inquire if he would like patient still started on insulin infusion. He reports he would not like patient started on insulin infusion. Insulin infusion discontinued at this time. He would still like patient to be admitted. Patient admitted for further evaluation care. All pertinent Lab data and radiographic results reviewed with patient at bedside. The patient and/or the family were informed of the results of any tests/labs/imaging, the treatment plan, and time was allotted to answer questions. Diagnosis, disposition, and treatment plan reviewed in detail with patient. Patient understands and agrees to admission at this time. In consideration of current COVID19 pandemic, with effort to minimize unnecessary provider exposure, this patient was seen at bedside by me independently. However, in compliance with current hospital ONESIMO/ED protocol, prior to admission I did discuss this patient case with emergency department physician, Dr. Sari Lyons. Clinical  IMPRESSION    1. Type 2 diabetes mellitus with hyperglycemia, without long-term current use of insulin (HCC)            Comment: Please note this report has been produced using speech recognition software and may contain errors related to that system including errors in grammar, punctuation, and spelling, as well as words and phrases that may be inappropriate. If there are any questions or concerns please feel free to contact the dictating provider for clarification.          Larissa Reid PA-C  07/05/21 0250

## 2021-07-05 NOTE — PROGRESS NOTES
Checked skin, no issues noted. Feet have a build up of old skin and calluses. Discussed with patient topics of diabetes. A1c, foot care, basic normal levels of BS, checking your blood regularly, eating controlled carbohydrates, and te long term effects of not managing your blood sugar.

## 2021-07-05 NOTE — H&P
History and Physical      Name:  Gene Mccormack /Age/Sex: 1970  (45 y.o. male)   MRN & CSN:  [id number] & [id number] Admission Date/Time: 2021 10:15 PM   Location:  ED14/ED-14 PCP: No primary care provider on file. Hospital Day: 2    Assessment and Plan:   Gene Mccormack is a 45 y.o.  male  who presents with Hyperosmolar hyperglycemic state (HHS) ([facility] [address]    1. Hyperosmolar hyperglycemic state with pseudohyponatremia in the setting of non-IDDM2  2. Medication noncompliance  -Admit to inpatient services  -Precipitating event was out of glyburide for approximately 2 weeks  -Hyperglycemia without evidence of diabetic ketoacidosis. Patient is non-toxic and not acutely ill-appearing. Patient received NS bolus 2L over 2 hours in the ED. -Blood glucose 847 on presentation  -Na 121, K 4.3, CO2 20, beta hydroxybutyrate 1.7, on presentation  -NPO for now  -Regular insulin IV bolus of 8 Units and n.p.o. low sliding scale with hypoglycemia protocol  -Hold home DM meds, will need adjusting prior to discharge  -Aggressive IVF with LR at 250 mL an hour for 10 hours  -BMP now and every 4 hours. Mg and Phos now and in a.m. with potassium, magnesium, and phosphorus as needed sliding scale protocol and replete as necessary with replacement protocol  -Check A1c  -Consulted endocrinology and diabetic education, appreciate recommendations      3. Tobacco abuse  -Nicotine patch  -Patient to be counseled to stop smoking and using tobacco. With explanation of the complications and consequences that might be encountered by smoking. These include but are not limited to cancer, stroke, heart disease and ultimately death.     Other chronic medical conditions:   Obesity   Poor oral dentition    Diet Diet NPO   DVT Prophylaxis [x] Lovenox, []  Heparin, [] SCDs, [] Ambulation   GI Prophylaxis [] PPI,  [] H2 Blocker,  [] Carafate,  [] Diet/Tube Feeds   Code Status Full Code   Disposition Patient requires continued admission due to Hyperosmolar hyperglycemic state (HHS) ([facility] [address]   MDM [] Low, [] Moderate,[x]  High  Patient's risk as above due to acuity of condition with potential for decompensation. History of Present Illness:     Chief Complaint: Hyperosmolar hyperglycemic state (HHS) ([facility] [address]    Kurt Ba is a 45 y.o.  male with a reviewed and a contributory family history of type 2 diabetes and a PMH as stated above, presents for refill of his glyburide. Patient endorsed only polyuria and polydipsia and was found to be in HHS. Patient endorsed onset of symptoms was gradual starting 2 weeks ago. States symptoms have gradually worsening over that time. Patient has non-insulin-dependent type 2 diabetes mellitus which was diagnosed 2017. Patients current treatment includes glyburide/metformin. Milton Lloyd reports being out of glyburide for approximately 2 weeks. Did state that he saved 1 pill in case he thought his blood glucose was getting too high. Patient has no history of previous DKA or HHS episodes. Endorses appetite change with \"lots of p.o. fluids and decreased food\" since onset of polyuria and polydipsia. Patient denies any dental caries or abscess, fevers or chills, cough or shortness of breath, sinus pressure or drainage, sore throat, dysuria, any rashes or skin lesions, or any other sources of infection that he is aware of. Otherwise patient denies headache, chest pain, palpitations, abdominal pain, nausea, vomiting, diarrhea, dark tarry stools, blood per rectum, or hematuria. Discussed case with ED provider. ROS:   Review of Systems   Constitutional: Positive for appetite change. Negative for chills, diaphoresis, fatigue and fever. HENT: Negative for congestion, dental problem, rhinorrhea, sinus pressure, sinus pain and sore throat. Eyes: Negative for visual disturbance. Respiratory: Negative for cough, chest tightness, shortness of breath and wheezing.     Cardiovascular: Negative for chest pain, palpitations and leg swelling. Gastrointestinal: Negative for abdominal pain, anal bleeding, blood in stool, constipation, diarrhea, nausea and vomiting. Endocrine: Positive for polydipsia and polyuria. Genitourinary: Positive for frequency. Negative for dysuria, hematuria and urgency. Musculoskeletal: Negative for arthralgias. Skin: Negative for color change, rash and wound. Neurological: Negative for dizziness, tremors, seizures, syncope, weakness, light-headedness, numbness and headaches. Psychiatric/Behavioral: Negative for confusion. Objective:   No intake or output data in the 24 hours ending 07/05/21 0202   Vitals:   Vitals:    07/05/21 0131   BP: (!) 113/90   Pulse:    Resp:    Temp:    SpO2: 97%     BP (!) 113/90   Pulse 83   Temp 97.9 °F (36.6 °C) (Oral)   Resp 23   Wt 255 lb 1.2 oz (115.7 kg)   SpO2 97%   BMI 35.58 kg/m²   Physical Exam:   Physical Exam  Vitals and nursing note reviewed. Constitutional:       General: He is awake. He is not in acute distress. Appearance: Normal appearance. He is obese. He is not ill-appearing, toxic-appearing or diaphoretic. Interventions: He is not intubated. HENT:      Head: Atraumatic. Right Ear: External ear normal.      Left Ear: External ear normal.      Nose: Nose normal. No rhinorrhea. Mouth/Throat:      Mouth: Mucous membranes are dry. Dentition: Abnormal dentition. Eyes:      General: No scleral icterus. Extraocular Movements: Extraocular movements intact. Conjunctiva/sclera: Conjunctivae normal.      Pupils: Pupils are equal, round, and reactive to light. Cardiovascular:      Rate and Rhythm: Normal rate and regular rhythm. Pulses: Normal pulses. Heart sounds: Normal heart sounds. No murmur heard. No gallop. Pulmonary:      Effort: Pulmonary effort is normal. No tachypnea, accessory muscle usage or respiratory distress. He is not intubated. Breath sounds: Normal breath sounds. No wheezing, rhonchi or rales. Abdominal:      General: Abdomen is protuberant. Bowel sounds are normal. There is no distension. Palpations: Abdomen is soft. Tenderness: There is no abdominal tenderness. There is no guarding or rebound. Negative signs include Mortensen's sign and Rovsing's sign. Musculoskeletal:         General: Normal range of motion. Cervical back: Neck supple. Right lower leg: No edema. Left lower leg: No edema. Skin:     General: Skin is warm and dry. Capillary Refill: Capillary refill takes less than 2 seconds. Neurological:      General: No focal deficit present. Mental Status: He is alert and oriented to person, place, and time. Mental status is at baseline. Cranial Nerves: No cranial nerve deficit, dysarthria or facial asymmetry. Motor: No tremor or seizure activity. Psychiatric:         Attention and Perception: He is attentive. Mood and Affect: Mood is not depressed. Speech: He is communicative. Speech is not rapid and pressured or slurred. Behavior: Behavior is not agitated. Behavior is cooperative. Past Medical History:      Past Medical History:   Diagnosis Date    Diabetes ([facility] [address]     Thyroid disease     scheduled for thyroid bx  8/20/12r/t(  has a mass right side of neck)     PSHX:  has a past surgical history that includes Dental surgery. Allergies: No Known Allergies    FAM HX: family history includes Blindness in his sister; Cancer in his father; Diabetes in his brother, mother, and sister; Heart Disease in his mother.   Soc HX:   Social History     Socioeconomic History    Marital status: Single     Spouse name: None    Number of children: None    Years of education: None    Highest education level: None   Occupational History    None   Tobacco Use    Smoking status: Current Some Day Smoker     Packs/day: 0.50     Years: 23.00     Pack years: 11.50     Types: Cigarettes    Smokeless tobacco: Never Used   Vaping Use    Vaping Use: Never used   Substance and Sexual Activity    Alcohol use: No    Drug use: No     Comment: occ use of marijuana-last used as a teenager    Sexual activity: None   Other Topics Concern    None   Social History Narrative    None     Social Determinants of Health     Financial Resource Strain:     Difficulty of Paying Living Expenses:    Food Insecurity:     Worried About Running Out of Food in the Last Year:     Ran Out of Food in the Last Year:    Transportation Needs:     Lack of Transportation (Medical):      Lack of Transportation (Non-Medical):    Physical Activity:     Days of Exercise per Week:     Minutes of Exercise per Session:    Stress:     Feeling of Stress :    Social Connections:     Frequency of Communication with Friends and Family:     Frequency of Social Gatherings with Friends and Family:     Attends Ep[employer]op[employer] Services:     Active Member of Clubs or Organizations:     Attends Club or Organization Meetings:     Marital Status:    Intimate Partner Violence:     Fear of Current or Ex-Partner:     Emotionally Abused:     Physically Abused:     Sexually Abused:        Data:   CBC with Differential:    Lab Results   Component Value Date    WBC 6.9 07/04/2021    RBC 4.90 07/04/2021    HGB 14.8 07/04/2021    HCT 41.1 07/04/2021     07/04/2021    MCV 83.9 07/04/2021    MCH 30.2 07/04/2021    MCHC 36.0 07/04/2021    RDW 11.9 07/04/2021    SEGSPCT 70.8 07/04/2021    LYMPHOPCT 21.8 07/04/2021    MONOPCT 4.9 07/04/2021    BASOPCT 0.6 07/04/2021    MONOSABS 0.3 07/04/2021    LYMPHSABS 1.5 07/04/2021    EOSABS 0.1 07/04/2021    BASOSABS 0.0 07/04/2021    DIFFTYPE AUTOMATED DIFFERENTIAL 07/04/2021       CMP:     Lab Results   Component Value Date     07/04/2021    K 4.3 07/04/2021    CL 86 07/04/2021    CO2 20 07/04/2021    BUN 13 07/04/2021    CREATININE 0.8 07/04/2021    GFRAA >60 07/04/2021    LABGLOM >60 07/04/2021    GLUCOSE 484 07/05/2021    PROT 6.8 07/04/2021    LABALBU 4.2 07/04/2021    CALCIUM 8.8 07/04/2021    BILITOT 0.6 07/04/2021    ALKPHOS 153 07/04/2021    AST 26 07/04/2021    ALT 59 07/04/2021       Troponin:  No results found for: TROPONINT    U/A:    Lab Results   Component Value Date    COLORU COLORLESS 07/04/2021    PROTEINU NEGATIVE 07/04/2021    WBCUA NONE SEEN 07/04/2021    RBCUA NONE SEEN 07/04/2021    MUCUS OCCASIONAL 07/04/2021    TRICHOMONAS NONE SEEN 07/04/2021    BACTERIA NEGATIVE 07/04/2021    CLARITYU CLEAR 07/04/2021    SPECGRAV 1.027 07/04/2021    LEUKOCYTESUR NEGATIVE 07/04/2021    UROBILINOGEN NEGATIVE 07/04/2021    BILIRUBINUR NEGATIVE 07/04/2021    BLOODU NEGATIVE 07/04/2021     Radiology results:  No orders to display          Medications:   Home Medications:   Prior to Admission medications    Medication Sig Start Date End Date Taking? Authorizing Provider   glyBURIDE (DIABETA) 2.5 MG tablet Take 1 tablet by mouth daily (with breakfast) 3/24/21   Nick Miller MD   metFORMIN (GLUCOPHAGE) 500 MG tablet Take 1 tablet by mouth 2 times daily (with meals) 8/6/17   Dinorah Alvarado MD     Medications:    Infusions:   PRN Meds:     Electronically signed by Pierre Peña DO on 7/5/2021 at 2:02 AM      This dictation was created with voice recognition software. While attempts have been made to review the dictation as it is transcribed, on occasion the spoken word can be misinterpreted by the technology leading to omissions or inappropriate words, phrases or sentences.

## 2021-07-05 NOTE — ED NOTES
The patient denies having his DM medications times 2 weeks due to loss of PCP     William De La Cruz RN  07/05/21 8002

## 2021-07-06 ENCOUNTER — APPOINTMENT (OUTPATIENT)
Dept: ULTRASOUND IMAGING | Age: 51
DRG: 639 | End: 2021-07-06
Payer: COMMERCIAL

## 2021-07-06 VITALS
HEART RATE: 82 BPM | RESPIRATION RATE: 16 BRPM | HEIGHT: 71 IN | DIASTOLIC BLOOD PRESSURE: 83 MMHG | SYSTOLIC BLOOD PRESSURE: 133 MMHG | TEMPERATURE: 98.5 F | OXYGEN SATURATION: 98 % | WEIGHT: 255.07 LBS | BODY MASS INDEX: 35.71 KG/M2

## 2021-07-06 LAB
ANION GAP SERPL CALCULATED.3IONS-SCNC: 11 MMOL/L (ref 4–16)
BUN BLDV-MCNC: 10 MG/DL (ref 6–23)
CALCIUM SERPL-MCNC: 8.3 MG/DL (ref 8.3–10.6)
CHLORIDE BLD-SCNC: 104 MMOL/L (ref 99–110)
CO2: 23 MMOL/L (ref 21–32)
CREAT SERPL-MCNC: 0.6 MG/DL (ref 0.9–1.3)
GFR AFRICAN AMERICAN: >60 ML/MIN/1.73M2
GFR NON-AFRICAN AMERICAN: >60 ML/MIN/1.73M2
GLUCOSE BLD-MCNC: 209 MG/DL (ref 70–99)
GLUCOSE BLD-MCNC: 221 MG/DL (ref 70–99)
GLUCOSE BLD-MCNC: 221 MG/DL (ref 70–99)
GLUCOSE BLD-MCNC: 227 MG/DL (ref 70–99)
MAGNESIUM: 1.9 MG/DL (ref 1.8–2.4)
PHOSPHORUS: 2.5 MG/DL (ref 2.5–4.9)
POTASSIUM SERPL-SCNC: 3.5 MMOL/L (ref 3.5–5.1)
SODIUM BLD-SCNC: 138 MMOL/L (ref 135–145)
T4 FREE: 1.13 NG/DL (ref 0.9–1.8)
TSH HIGH SENSITIVITY: 0.69 UIU/ML (ref 0.27–4.2)

## 2021-07-06 PROCEDURE — 6360000002 HC RX W HCPCS: Performed by: STUDENT IN AN ORGANIZED HEALTH CARE EDUCATION/TRAINING PROGRAM

## 2021-07-06 PROCEDURE — 36415 COLL VENOUS BLD VENIPUNCTURE: CPT

## 2021-07-06 PROCEDURE — 96372 THER/PROPH/DIAG INJ SC/IM: CPT

## 2021-07-06 PROCEDURE — 83735 ASSAY OF MAGNESIUM: CPT

## 2021-07-06 PROCEDURE — 94761 N-INVAS EAR/PLS OXIMETRY MLT: CPT

## 2021-07-06 PROCEDURE — G0378 HOSPITAL OBSERVATION PER HR: HCPCS

## 2021-07-06 PROCEDURE — 86800 THYROGLOBULIN ANTIBODY: CPT

## 2021-07-06 PROCEDURE — 84100 ASSAY OF PHOSPHORUS: CPT

## 2021-07-06 PROCEDURE — 86376 MICROSOMAL ANTIBODY EACH: CPT

## 2021-07-06 PROCEDURE — 84443 ASSAY THYROID STIM HORMONE: CPT

## 2021-07-06 PROCEDURE — 84439 ASSAY OF FREE THYROXINE: CPT

## 2021-07-06 PROCEDURE — 80048 BASIC METABOLIC PNL TOTAL CA: CPT

## 2021-07-06 PROCEDURE — 76536 US EXAM OF HEAD AND NECK: CPT

## 2021-07-06 RX ORDER — INSULIN GLARGINE 100 [IU]/ML
30 INJECTION, SOLUTION SUBCUTANEOUS NIGHTLY
Status: DISCONTINUED | OUTPATIENT
Start: 2021-07-06 | End: 2021-07-06 | Stop reason: HOSPADM

## 2021-07-06 RX ORDER — GLUCOSAMINE HCL/CHONDROITIN SU 500-400 MG
CAPSULE ORAL
Qty: 500 STRIP | Refills: 0 | Status: SHIPPED | OUTPATIENT
Start: 2021-07-06

## 2021-07-06 RX ORDER — BLOOD-GLUCOSE METER
1 KIT MISCELLANEOUS DAILY
Qty: 1 KIT | Refills: 0 | Status: SHIPPED | OUTPATIENT
Start: 2021-07-06

## 2021-07-06 RX ORDER — INSULIN GLARGINE 100 [IU]/ML
20 INJECTION, SOLUTION SUBCUTANEOUS NIGHTLY
Qty: 5 PEN | Refills: 0 | Status: SHIPPED | OUTPATIENT
Start: 2021-07-06

## 2021-07-06 RX ORDER — GLYBURIDE 5 MG/1
2.5 TABLET ORAL 2 TIMES DAILY WITH MEALS
Qty: 60 TABLET | Refills: 0 | Status: SHIPPED | OUTPATIENT
Start: 2021-07-06 | End: 2021-07-06 | Stop reason: SDUPTHER

## 2021-07-06 RX ORDER — PEN NEEDLE, DIABETIC 29 GAUGE
1 NEEDLE, DISPOSABLE MISCELLANEOUS DAILY
Qty: 100 EACH | Refills: 3 | Status: SHIPPED | OUTPATIENT
Start: 2021-07-06

## 2021-07-06 RX ORDER — SYRING-NEEDL,DISP,INSUL,0.3 ML 30 GX5/16"
1 SYRINGE, EMPTY DISPOSABLE MISCELLANEOUS 4 TIMES DAILY
Qty: 100 EACH | Refills: 3 | Status: SHIPPED | OUTPATIENT
Start: 2021-07-06

## 2021-07-06 RX ORDER — INSULIN GLARGINE 100 [IU]/ML
20 INJECTION, SOLUTION SUBCUTANEOUS NIGHTLY
Qty: 1 VIAL | Refills: 3 | Status: SHIPPED
Start: 2021-07-06 | End: 2021-07-06 | Stop reason: HOSPADM

## 2021-07-06 RX ORDER — INSULIN GLARGINE 100 [IU]/ML
20 INJECTION, SOLUTION SUBCUTANEOUS NIGHTLY
Qty: 1 VIAL | Refills: 3 | Status: SHIPPED | OUTPATIENT
Start: 2021-07-06 | End: 2021-07-06

## 2021-07-06 RX ORDER — GLYBURIDE 5 MG/1
10 TABLET ORAL 2 TIMES DAILY WITH MEALS
Qty: 60 TABLET | Refills: 0 | Status: SHIPPED | OUTPATIENT
Start: 2021-07-06

## 2021-07-06 RX ADMIN — ENOXAPARIN SODIUM 40 MG: 40 INJECTION SUBCUTANEOUS at 08:40

## 2021-07-06 NOTE — CARE COORDINATION
CM collaborated with Dr Marlin Rojas. Pt diabetic and has no supplies. CM requested meds to beds so that [facility] will follow. CM into see pt to initiate a safe discharge plan. Cm introduced self and explained role of CM. Pt is kind, alert and oriented. Pt lives alone. He drove self to hospital. Pt is supported by a sister and brother in law. Pt shared that he has insurance but they do not cover everything. CM received permission to call [facility]. CM messaged [address] Pt uses no DME. Pt has a PCP but has not seen in a year. CM received permission to call Dr Tiffanie Monaco Phone: [phone] to see if they will follow. CM called and spoke with Messi Caro and set appointment. CM received permission to fax information to Dr Nate King office. Fax [phone]. Appointment time Monday July 12 at 200  Pt is to bring his insurance card  Pt to bring all medications. Pt is wear a mask  Pt is to call and cancel/change appointment if needed  CM provided card and encouraged to call for any needs or concern. CM is available if any needs arise.    [facility] faxed H/P, face sheet and D/S to Dr Tigre Bradshaw office

## 2021-07-06 NOTE — PROGRESS NOTES
CLINICAL PHARMACY NOTE: MEDS TO BEDS    Total # of Prescriptions Filled: 6   The following medications were delivered to the patient:  · Lancets  · Pen needles  · Blood glucose meter  · Metformin 1000mg  · Glyburide 5mg  · Blood glucose test strips      Additional Documentation:  Transferred prescription for Lantus to [address] where patient could get for $35.

## 2021-07-06 NOTE — PROGRESS NOTES
Progress Note( Dr. Gi Royal)  7/6/2021  Subjective:   Admit Date: 7/4/2021  PCP: No primary care provider on file. Admitted For : Hyperglycemia as patient has missed taking his medicines as he ran out    Consulted For: Better control of blood glucose    Interval History: Much better as blood glucose control at better    Denies any chest pains,   Denies SOB . Denies nausea or vomiting. No new bowel or bladder symptoms. Intake/Output Summary (Last 24 hours) at 7/6/2021 0714  Last data filed at 7/5/2021 2233  Gross per 24 hour   Intake 120 ml   Output    Net 120 ml       DATA    CBC:   Recent Labs     07/04/21 2245 07/05/21  0715   WBC 6.9 6.4   HGB 14.8 14.0    215    CMP:  Recent Labs     07/04/21 2245 07/05/21  0807 07/05/21  1654   * 137 137   K 4.3 3.8 3.8   CL 86* 103 103   CO2 20* 22 23   BUN 13 10 10   CREATININE 0.8* 0.6* 0.6*   CALCIUM 8.8 8.1* 8.3   PROT 6.8  --   --    LABALBU 4.2  --   --    BILITOT 0.6  --   --    ALKPHOS 153*  --   --    AST 26  --   --    ALT 59*  --   --      Lipids: No results found for: CHOL, HDL, TRIG  Glucose:  Recent Labs     07/05/21  1643 07/05/21  2233 07/06/21  0251   POCGLU 250* 261* 221*     [id number]:  Lab Results   Component Value Date    LABA1C 16.0 07/05/2021     High Sensitivity TSH: No results found for: TSHHS  Free T3: No results found for: FT3  Free T4:No results found for: T4FREE    US HEAD NECK SOFT TISSUE THYROID    Result Date: 7/6/2021  1. Heterogeneous thyroid gland is smaller in size since the previous exam, compatible with underlying thyroiditis. 2. There is a tear 3 nodule noted in the upper pole left lobe thyroid gland, measuring up to 1.9 cm. This was not seen on the previous ultrasound. Follow-up ultrasound at 1, 3, and 5 years recommended. 3. No other suspicious nodules are identified.  RECOMMENDATIONS: ACR TI-RADS recommendations: TR5 (>= 7 points):  FNA if >= 1 cm; follow-up if 0.5-0.9 cm in 1, 2, 3, 4, and 5 years TR4 (4-6 points):  FNA if >= 1.5 cm; follow-up if 1.0-1.4 cm in 1, 2, 3, and 5 years TR3 (3 points):  FNA if >= 2.5 cm; follow-up if 1.5-2.4 cm in 1, 3, and 5 years TR2 (2 points):  No FNA or follow-up TR1 (0 points):  No FNA or follow-up ACR TI-RADS recommends that no more than two nodules with the highest ACR TI-RADS point total should be biopsied and no more than four nodules should be followed. Scheduled Medicines   Medications:    insulin glargine  30 Units Subcutaneous Nightly    enoxaparin  40 mg Subcutaneous Daily    nicotine  1 patch Transdermal Daily    insulin lispro  10 Units Subcutaneous TID WC    insulin lispro  0-6 Units Subcutaneous TID WC    insulin lispro  0-3 Units Subcutaneous 2 times per day      Infusions:    dextrose           Objective:   Vitals: /79   Pulse 83   Temp 97.9 °F (36.6 °C) (Oral)   Resp 16   Ht 5' 10.98\" (1.803 m)   Wt 255 lb 1.2 oz (115.7 kg)   SpO2 98%   BMI 35.59 kg/m²   General appearance: alert and cooperative with exam  Neck: no JVD or bruit  Thyroid : Multinodular goiter left nodule is  bigger than the right 1  Lungs: Has Vesicular Breath sounds   Heart:  regular rate and rhythm  Abdomen: soft, non-tender; bowel sounds normal; no masses,  no organomegaly  Musculoskeletal: Normal  Extremities: extremities normal, , no edema  Neurologic:  Awake, alert, oriented to name, place and time. Cranial nerves II-XII are grossly intact. Motor is  intact. Sensory is Intact. ,  and gait is normal.    Assessment:     Patient Active Problem List:     Neck mass     Hyperosmolar hyperglycemic state (HHS) ([address]    Multinodular goiter> the left side     Plan:     1. Reviewed POC blood glucose . Labs and X ray results   2. Reviewed Current Medicines   3. On meal/ Correction bolus Humalog/ Basal Lantus Insulin regime   4. Monitor Blood glucose frequently   5. Modified  the dose of Insulin/ other medicines as needed  6. Other thyroid function tests are pending  7.  Will

## 2021-07-06 NOTE — PROGRESS NOTES
Nutrition Education    · Verbally reviewed information with Patient (Noted A1c 16% with hx of BG >800)  Educated on Consistent Carbohydrate Counting for People with Diabetes Nutrition Therapy from Nutrition Care Manual.   Discussed carbohydrate counting, label reading, healthy plate, BG regulation, hemoglobin A1C level, symptoms of hyperglycemia and hypoglycemia. Encouraged patient to follow up with PCP for BG regulation and diabetes management. · Written educational materials provided. · Contact name and number provided. · Refer to Patient Education activity for more details.     Electronically signed by Emilio Alves RD, LD on 7/6/21 at 1:18 PM EDT    Contact: [phone]

## 2021-07-06 NOTE — CARE COORDINATION
Received referral from Edna Woody. Patient ran out of meds due to changing PCP. He was on glyburide and metformin prior to admission. Will follow for new medications at discharge if he needs help with cost.      10:33-Checked with Omar Torres and patients insurance DOES cover medications. I faxed a 1x voucher. Notified that patients insulin is free with his insurance. Patient placed on flagged list and will need to do a complete application should he need further assistance.

## 2021-07-06 NOTE — CONSULTS
Endocrinology   Consult Note      Dear Doctor Татьяна Lbaoy    Thank You for the Consult     Pt. Was Admitted for : Hyperglycemia    Reason for Consult: Better control of blood glucose      History Obtained From:  Patient/ EMR       HISTORY OF PRESENT ILLNESS:                The patient is a 45 y.o. male with significant past medical history of diabetes mellitus and a goiter was admitted to hospital for hyperglycemia patient ran out of his medicines for last couple weeks ago. She was treated with intravenous fluids and started on insulin reaction I was  consulted for better control of blood glucose. ROS:   Pt's ROS done in detail. Abnormal ROS are noted in Medical and Surgical History Section below: Other Medical History:        Diagnosis Date    Diabetes [address] [address]     Thyroid disease     scheduled for thyroid bx  8/20/12r/t(  has a mass right side of neck)     Surgical History:        Procedure Laterality Date    DENTAL SURGERY         Allergies:  Patient has no known allergies. Family History:       Problem Relation Age of Onset    Cancer Father         lung ca    Diabetes Sister     Blindness Sister     Heart Disease Mother     Diabetes Mother     Diabetes Brother      REVIEW OF SYSTEMS:  Review of System Done as noted above     PHYSICAL EXAM:      Vitals:    /81   Pulse 80   Temp 97.9 °F (36.6 °C) (Oral)   Resp 16   Ht 5' 10.98\" (1.803 m)   Wt 255 lb 1.2 oz (115.7 kg)   SpO2 93%   BMI 35.59 kg/m²     CONSTITUTIONAL:  awake, alert, cooperative, appears stated age   EYES:  vision intact Fundoscopic Exam not performed   ENT:Normal  NECK:  Supple, No JVD.    Thyroid Exam: Multinodular goiter right back in the left side  LUNGS:  Has Vesicular Breath Sounds,   CARDIOVASCULAR:  Normal apical impulse, regular rate and rhythm, normal S1 and S2, no S3 or S4, and has no  murmur   ABDOMEN:  No scars, normal bowel sounds, soft, non-distended, non-tender, no masses palpated, no hepatolienomegaly  Musculoskeletal: Normal  Extremities: Normal, peripheral pulses normal, , has no edema   NEUROLOGIC:  Awake, alert, oriented to name, place and time. Cranial nerves II-XII are grossly intact. Motor is  intact. Sensory possible neuropathy. ,  and gait is normal.    DATA:    CBC:   Recent Labs     07/04/21 2245 07/05/21  0715   WBC 6.9 6.4   HGB 14.8 14.0    215    CMP:  Recent Labs     07/04/21  2245 07/05/21  0807 07/05/21  1654   * 137 137   K 4.3 3.8 3.8   CL 86* 103 103   CO2 20* 22 23   BUN 13 10 10   CREATININE 0.8* 0.6* 0.6*   CALCIUM 8.8 8.1* 8.3   PROT 6.8  --   --    LABALBU 4.2  --   --    BILITOT 0.6  --   --    ALKPHOS 153*  --   --    AST 26  --   --    ALT 59*  --   --      Lipids: No results found for: CHOL, HDL, TRIG  Glucose:   Recent Labs     07/05/21  1011 07/05/21  1449 07/05/21  1643   POCGLU 233* 249* 250*     Hemoglobin A1C:   Lab Results   Component Value Date    LABA1C 16.0 07/05/2021     Free T4: No results found for: T4FREE  Free T3: No results found for: FT3  TSH High Sensitivity: No results found for: TSHHS    No results found. Scheduled Medicines   Medications:    enoxaparin  40 mg Subcutaneous Daily    nicotine  1 patch Transdermal Daily    insulin lispro  0-6 Units Subcutaneous Q4H    insulin glargine  25 Units Subcutaneous Nightly    [START ON 7/6/2021] insulin lispro  10 Units Subcutaneous TID WC    [START ON 7/6/2021] insulin lispro  0-6 Units Subcutaneous TID WC    [START ON 7/6/2021] insulin lispro  0-3 Units Subcutaneous 2 times per day      Infusions:    dextrose           IMPRESSION    Patient Active Problem List   Diagnosis    Neck mass    Hyperosmolar hyperglycemic state (HHS) ([facility] [address]        Multinodular goiter] the left side    RECOMMENDATIONS:      1. Reviewed POC blood glucose . Labs and X ray results   2. Reviewed Home and Current Medicines   3.  Will Start On meal/ Correction bolus Humalog/ Lantus Insulin regime 4. Evaluate his thyroid function test ultrasound thyroid gland  5. Monitor Blood glucose frequently   6. Modify  the dose of Insulin  as needed        Will follow with you  Again thank you for sharing pt's care with me.      Truly yours,       Ruby Thompson MD  .

## 2021-07-06 NOTE — DISCHARGE SUMMARY
Discharge Summary    Name:  Kurt Ba /Age/Sex: 1970  (45 y.o. male)   MRN & CSN:  [id number] & [id number] Admission Date/Time: 2021 10:15 PM   Attending:  Farnaz Krishna MD Discharging Physician: Farnaz Krishna MD     Hospital Course:   Kurt Ba is a 45 y.o.  male with a past medical history of type 2 diabetes who presents with Hyperosmolar hyperglycemic state (HHS) [facility])  Patient reported that he had not had his meds for greater than 2 weeks. He does not have a doctor who prescribes his meds for him. His blood glucose was >800 on arrival.  His A1c is 16. At this time, he will need insulin therapy in addition to oral hypoglycemic agents for aggressive blood sugar management. He will be discharged on Lantus 20 units nightly, glyburide 10 mg twice daily, Metformin 1 g twice daily. I have discussed this case with case management to pursue outpatient follow-up with PCP. Patient does not have an established PCP yet. He was counseled extensively on the need to do Accu-Cheks 4 times a day. He will need a glucometer. Finally he was also counseled about smoking cessation as this in addition to diabetes increases his risk multiple fold of CAD and CVA. The patient/family expressed appropriate understanding of and agreement with the discharge recommendations, medications, and plan.      Consults this admission:  IP CONSULT TO HOSPITALIST  IP CONSULT TO ENDOCRINOLOGY  IP CONSULT TO DIABETES EDUCATOR    Discharge Instruction:   Follow up appointments: Needs PCP  Primary care physician:  within 2 weeks    Diet:  diabetic diet   Activity: activity as tolerated  Disposition: Discharged to:   [x]Home, [][facility], []SNF, []Acute Rehab, []Hospice   Condition on discharge: Stable    Discharge Medications:      Jacoby Wing   Home Medication Instructions MWN:[id number]    Printed on:21 4029   Medication Information                      glyBURIDE (DIABETA) 5 MG tablet  Take 2 tablets by mouth 2 times daily (with meals)             insulin glargine (LANTUS) 100 UNIT/ML injection vial  Inject 20 Units into the skin nightly             metFORMIN (GLUCOPHAGE) 1000 MG tablet  Take 1 tablet by mouth 2 times daily (with meals)                 Objective Findings at Discharge:   /83   Pulse 82   Temp 98.5 °F (36.9 °C) (Oral)   Resp 16   Ht 5' 10.98\" (1.803 m)   Wt 255 lb 1.2 oz (115.7 kg)   SpO2 98%   BMI 35.59 kg/m²            PHYSICAL EXAM  GEN: Awake male, alert and oriented x3 in no apparent distress. Appears given age. HEENT: Normal   NECK: Supple, no apparent thyromegaly or masses. No KEILY  RESP: Clear lung fields bilaterally. Symmetric chest movement while on room air. CVS: RRR, S1, S2  GI/: Abdomen is soft, nontender, no organomegaly. . Bowel sounds normal, rectal exam deferred. No CVA tenderness. MSK: No gross joint deformities. No tenderness  SKIN: Normal coloration, warm, dry. NEURO: Cranial nerves appear grossly intact, normal speech, no lateralizing weakness. PSYCH: Awake, alert, oriented x 4. Affect appropriate. BMP/CBC  Recent Labs     07/04/21  2245 07/04/21  2245 07/05/21  0715 07/05/21  0807 07/05/21  1654 07/06/21  0824   *   < >  --  137 137 138   K 4.3   < >  --  3.8 3.8 3.5   CL 86*   < >  --  103 103 104   CO2 20*   < >  --  22 23 23   BUN 13   < >  --  10 10 10   CREATININE 0.8*   < >  --  0.6* 0.6* 0.6*   WBC 6.9  --  6.4  --   --   --    HCT 41.1*  --  39.3*  --   --   --      --  215  --   --   --     < > = values in this interval not displayed. IMAGING:  Thyroid ultrasound  1. Heterogeneous thyroid gland that is smaller in size since the previous   exam, compatible with underlying thyroiditis. 2. There is a T3 nodule noted in the upper pole of the left lobe of the   thyroid gland, measuring up to 1.9 cm.  This was not seen on the previous   ultrasound.  Follow-up ultrasound at 1, 3, and 5 years are recommended.    3. No other suspicious nodules are identified.             Discharge Time of 45 minutes    Electronically signed by Mari Juarez MD on 7/6/2021 at 9:28 AM

## 2021-07-07 LAB
ANTITHYROGLOBULIN AB: <0.9 IU/ML (ref 0–4)
ANTITHYROID MICORSOMAL: 0.5 IU/ML (ref 0–9)

## 2022-02-27 ENCOUNTER — HOSPITAL ENCOUNTER (EMERGENCY)
Age: 52
Discharge: HOME | End: 2022-02-27
Payer: COMMERCIAL

## 2022-02-27 VITALS
OXYGEN SATURATION: 99 % | TEMPERATURE: 98.3 F | DIASTOLIC BLOOD PRESSURE: 97 MMHG | BODY MASS INDEX: 36.6 KG/M2 | SYSTOLIC BLOOD PRESSURE: 128 MMHG | RESPIRATION RATE: 16 BRPM | HEART RATE: 80 BPM | HEIGHT: 70 IN

## 2022-02-27 DIAGNOSIS — H61.21 IMPACTED CERUMEN OF RIGHT EAR: Primary | ICD-10-CM

## 2022-02-27 PROCEDURE — 99284 EMERGENCY DEPT VISIT MOD MDM: CPT

## 2022-02-27 PROCEDURE — 6370000000 HC RX 637 (ALT 250 FOR IP): Performed by: PHYSICIAN ASSISTANT

## 2022-02-27 PROCEDURE — 69209 REMOVE IMPACTED EAR WAX UNI: CPT

## 2022-02-27 RX ORDER — OFLOXACIN 3 MG/ML
5 SOLUTION AURICULAR (OTIC) 2 TIMES DAILY
Qty: 1 EACH | Refills: 0 | Status: SHIPPED | OUTPATIENT
Start: 2022-02-27 | End: 2022-03-06

## 2022-02-27 RX ADMIN — Medication 5 DROP: at 13:54

## 2022-02-27 NOTE — ED PROVIDER NOTES
EMERGENCY DEPARTMENT ENCOUNTER      PCP: Liseth Woo MD    [address] [facility]    Chief Complaint   Patient presents with    Ear Fullness     pt complains of muffled hearing on right side after attempting to clear ear with water and bulb yesterday evening       This patient was not evaluated by the attending physician. I have independently evaluated this patient. My supervising physician was available for consultation. HPI    Gloria Herrera is a 45 y.o. male who presents with right ear fullness since yesterday evening. Context is patient reports history of cerumen impactions and reports that he tried to clear his right ear using water and a bulb syringe without success. There is prior history of cerumen impaction. Patient reports decreased hearing of right ear. Patient denies fever, chills, nasal congestion, sore throat, difficulty swallowing, shortness of breath, eye pain, drainage from the ears, left ear pain, hearing change of left ear. REVIEW OF SYSTEMS    Constitutional:  Denies fever, chills  HEENT:  See above  Cardiovascular:  Denies chest pain, palpitations.   Respiratory:  Denies cough or shortness of breath   GI:  Denies abdominal pain, vomiting, or diarrhea   Neurologic:  Denies confusion, light headedness, dizziness, or syncope     All other review of systems are negative  See HPI and nursing notes for additional information       PAST MEDICAL AND SURGICAL HISTORY    Past Medical History:   Diagnosis Date    Diabetes ([address]     Thyroid disease     scheduled for thyroid bx  8/20/12r/t(  has a mass right side of neck)     Past Surgical History:   Procedure Laterality Date    DENTAL SURGERY         CURRENT MEDICATIONS    Current Outpatient Rx   Medication Sig Dispense Refill    ofloxacin (FLOXIN) 0.3 % otic solution Place 5 drops into the right ear 2 times daily for 7 days 1 each 0    glyBURIDE (DIABETA) 5 MG tablet Take 2 tablets by mouth 2 times daily (with meals) 60 tablet 0  metFORMIN (GLUCOPHAGE) 1000 MG tablet Take 1 tablet by mouth 2 times daily (with meals) 60 tablet 0    glucose monitoring (FREESTYLE FREEDOM) kit 1 kit by Does not apply route daily 1 kit 0    Lancets (AIMSCO ULTRA THIN AUTO LANCET) MISC 1 each by Does not apply route 4 times daily 100 each 3    blood glucose monitor strips Test 3-4 times a day & as needed for symptoms of irregular blood glucose. Dispense sufficient amount for indicated testing frequency plus additional to accommodate PRN testing needs.  500 strip 0    insulin glargine (LANTUS SOLOSTAR) 100 UNIT/ML injection pen Inject 20 Units into the skin nightly 5 pen 0    Insulin Pen Needle (Crystal Jason PEN NEEDLES 29G) 29G X 12MM MISC 1 each by Does not apply route daily 100 each 3       ALLERGIES    No Known Allergies    FAMILY AND SOCIAL HISTORY    Family History   Problem Relation Age of Onset    Cancer Father         lung ca    Diabetes Sister     Blindness Sister     Heart Disease Mother     Diabetes Mother     Diabetes Brother      Social History     Socioeconomic History    Marital status: Single     Spouse name: Not on file    Number of children: Not on file    Years of education: Not on file    Highest education level: Not on file   Occupational History    Not on file   Tobacco Use    Smoking status: Current Some Day Smoker     Packs/day: 0.50     Years: 23.00     Pack years: 11.50     Types: Cigarettes    Smokeless tobacco: Never Used   Vaping Use    Vaping Use: Never used   Substance and Sexual Activity    Alcohol use: No    Drug use: No     Comment: occ use of marijuana-last used as a teenager    Sexual activity: Not on file   Other Topics Concern    Not on file   Social History Narrative    Not on file     Social Determinants of Health     Financial Resource Strain:     Difficulty of Paying Living Expenses: Not on file   Food Insecurity:     Worried About Running Out of Food in the Last Year: Not on file    Joanna of Food in the Last Year: Not on file   Transportation Needs:     Lack of Transportation (Medical): Not on file    Lack of Transportation (Non-Medical): Not on file   Physical Activity:     Days of Exercise per Week: Not on file    Minutes of Exercise per Session: Not on file   Stress:     Feeling of Stress : Not on file   Social Connections:     Frequency of Communication with Friends and Family: Not on file    Frequency of Social Gatherings with Friends and Family: Not on file    Attends Baptism Services: Not on file    Active Member of [address] [place] [address] [employer] or Organizations: Not on file    Attends Club or Organization Meetings: Not on file    Marital Status: Not on file   Intimate Partner Violence:     Fear of Current or Ex-Partner: Not on file    Emotionally Abused: Not on file    Physically Abused: Not on file    Sexually Abused: Not on file   Housing Stability:     Unable to Pay for Housing in the Last Year: Not on file    Number of [place] in the Last Year: Not on file    Unstable Housing in the Last Year: Not on file         PHYSICAL EXAM    VITAL SIGNS: BP (!) 128/97   Pulse 80   Temp 98.3 °F (36.8 °C) (Oral)   Resp 16   Ht 5' 10\" (1.778 m)   SpO2 99%   BMI 36.60 kg/m²    Constitutional:  Well developed, Well nourished, No acute distress, Non-toxic appearance. HENT:    Normocephalic, Atraumatic  Bilateral external ears normal.  Bilateral EAC with cerumen impactions, uable to visualize any of Right TM but able to partially visualize left TM. Right EAC moderately erythematous. Left EAC not erythematous. No necrosis. Oropharynx moist, No oral exudates, Nose normal. Neck- Normal range of motion, No tenderness, Supple, No stridor. Eyes:  EOMI, Conjunctiva normal, No discharge. Cardiovascular: No edema  Musculoskeletal:  No Obvious swelling, coloration. Integument:  Warm, Dry, No erythema, No rash. Lymphatic:  No lymphadenopathy noted.    Neurologic:  Alert & oriented, Normal motor function, Normal sensory function, No focal deficits noted. Psychiatric:  Affect normal, Judgment normal, Mood normal.         ________________________________________________________________________    Procedure Note:  Ear Canal Cerumen Removal     Indication:  External Ear Canal Cerumen impaction    Procedure:   - Procedure explained, including risks and benefits explained to the patient who expressed understanding. All questions were answered. Verbal consent obtained. - After placing the patient's head in the appropriate position, the patient's right ear canal was irrigated with the appropriate solution until no more cerumen was able to be removed. The patient was referred to an ENT specialist for further management. The patient tolerated the procedure well. Complications: None    ________________________________________________________________________          ED COURSE & MEDICAL DECISION MAKING       Vital signs and nursing notes reviewed during ED course. I have independently evaluated this patient. Supervising physician present in the Emergency Department, available for consultation, throughout entirety of patient care. History and exam consistent with right cerumen impaction. While in the ED today patient underwent irrigation and debrox drops instilled in R EAC to help remove cerumen impaction but there is still impacted cerumen patient will be referred over to ENT for further management. Given erythema of right EAC will start patient on ofloxacin eardrops. Patient to keep right ear clean and dry with coughing and or other active eardrops prescribed today. Patient provided ENT referral. Patient is nontoxic appearing. Vital signs stable. Patient is stable for outpatient management. All pertinent Lab data and radiographic results reviewed with patient at bedside.        The patient and/or the family were informed of the results of any tests/labs/imaging, the treatment plan, and time was allotted to answer questions. Diagnosis, disposition, and treatment plan reviewed in detail with patient. Patient understands and agrees to follow-up with ENT physician for recheck as soon as possible. Patient understands and agrees to return to emergency department for any new or worsening symptoms - strict return precautions given. Clinical  IMPRESSION    1. Impacted cerumen of right ear          Disposition referral (if applicable):  Shine Souza MD  [address] [facility]  [phone]    Schedule an appointment as soon as possible for a visit   Recheck as soon as possible    [facility] Emergency Department  [place] [id number] [phone]  Go to   Return to ED if symptoms worsen or new symptoms      Disposition medications (if applicable):  New Prescriptions    OFLOXACIN (FLOXIN) 0.3 % OTIC SOLUTION    Place 5 drops into the right ear 2 times daily for 7 days         Comment: Please note this report has been produced using speech recognition software and may contain errors related to that system including errors in grammar, punctuation, and spelling, as well as words and phrases that may be inappropriate. If there are any questions or concerns please feel free to contact the dictating provider for clarification.              Anca Mccoy PA-C  02/27/22 [id number]

## 2022-02-27 NOTE — FLOWSHEET NOTE
Pt complains of R ear fullness and muffledness after attempting to clear ear wax from ear on 2-26 around 1600. Pt complains of difficulty hearing on right side and denies pain.  Pt states \"its only uncomfortable\"

## 2024-03-24 ENCOUNTER — APPOINTMENT (OUTPATIENT)
Dept: ULTRASOUND IMAGING | Age: 54
End: 2024-03-24

## 2024-03-24 ENCOUNTER — APPOINTMENT (OUTPATIENT)
Dept: CT IMAGING | Age: 54
End: 2024-03-24

## 2024-03-24 ENCOUNTER — HOSPITAL ENCOUNTER (OUTPATIENT)
Age: 54
Setting detail: OBSERVATION
Discharge: HOME | End: 2024-03-25
Attending: EMERGENCY MEDICINE

## 2024-03-24 ENCOUNTER — APPOINTMENT (OUTPATIENT)
Dept: GENERAL RADIOLOGY | Age: 54
End: 2024-03-24

## 2024-03-24 ENCOUNTER — APPOINTMENT (OUTPATIENT)
Dept: MRI IMAGING | Age: 54
End: 2024-03-24

## 2024-03-24 DIAGNOSIS — R73.9 HYPERGLYCEMIA: ICD-10-CM

## 2024-03-24 DIAGNOSIS — R29.90 STROKE-LIKE SYMPTOMS: Primary | ICD-10-CM

## 2024-03-24 DIAGNOSIS — R29.898 RIGHT ARM WEAKNESS: ICD-10-CM

## 2024-03-24 LAB
ALBUMIN SERPL-MCNC: 4.2 GM/DL (ref 3.4–5)
ALP BLD-CCNC: 110 IU/L (ref 40–129)
ALT SERPL-CCNC: 27 U/L (ref 10–40)
ANION GAP SERPL CALCULATED.3IONS-SCNC: 11 MMOL/L (ref 7–16)
AST SERPL-CCNC: 13 IU/L (ref 15–37)
BASOPHILS ABSOLUTE: 0 K/CU MM
BASOPHILS RELATIVE PERCENT: 0.5 % (ref 0–1)
BILIRUB SERPL-MCNC: 0.6 MG/DL (ref 0–1)
BUN SERPL-MCNC: 8 MG/DL (ref 6–23)
CALCIUM SERPL-MCNC: 8.8 MG/DL (ref 8.3–10.6)
CHLORIDE BLD-SCNC: 103 MMOL/L (ref 99–110)
CHOLEST SERPL-MCNC: 147 MG/DL
CO2: 23 MMOL/L (ref 21–32)
CREAT SERPL-MCNC: 0.7 MG/DL (ref 0.9–1.3)
DIFFERENTIAL TYPE: ABNORMAL
EKG ATRIAL RATE: 79 BPM
EKG DIAGNOSIS: NORMAL
EKG P AXIS: 40 DEGREES
EKG P-R INTERVAL: 160 MS
EKG Q-T INTERVAL: 392 MS
EKG QRS DURATION: 100 MS
EKG QTC CALCULATION (BAZETT): 449 MS
EKG R AXIS: -5 DEGREES
EKG T AXIS: 42 DEGREES
EKG VENTRICULAR RATE: 79 BPM
EOSINOPHILS ABSOLUTE: 0.1 K/CU MM
EOSINOPHILS RELATIVE PERCENT: 0.7 % (ref 0–3)
ESTIMATED AVERAGE GLUCOSE: 289 MG/DL
GFR SERPL CREATININE-BSD FRML MDRD: >60 ML/MIN/1.73M2
GLUCOSE BLD-MCNC: 146 MG/DL (ref 70–99)
GLUCOSE BLD-MCNC: 226 MG/DL (ref 70–99)
GLUCOSE BLD-MCNC: 229 MG/DL (ref 70–99)
GLUCOSE BLD-MCNC: 246 MG/DL (ref 70–99)
GLUCOSE BLD-MCNC: 327 MG/DL (ref 70–99)
GLUCOSE SERPL-MCNC: 400 MG/DL (ref 70–99)
HBA1C MFR BLD: 11.7 % (ref 4.2–6.3)
HCT VFR BLD CALC: 42.9 % (ref 42–52)
HDLC SERPL-MCNC: 49 MG/DL
HEMOGLOBIN: 14.4 GM/DL (ref 13.5–18)
IMMATURE NEUTROPHIL %: 0.2 % (ref 0–0.43)
INR BLD: 1 INDEX
LDLC SERPL CALC-MCNC: 82 MG/DL
LYMPHOCYTES ABSOLUTE: 1.2 K/CU MM
LYMPHOCYTES RELATIVE PERCENT: 15.1 % (ref 24–44)
MCH RBC QN AUTO: 28.9 PG (ref 27–31)
MCHC RBC AUTO-ENTMCNC: 33.6 % (ref 32–36)
MCV RBC AUTO: 86.1 FL (ref 78–100)
MONOCYTES ABSOLUTE: 0.4 K/CU MM
MONOCYTES RELATIVE PERCENT: 4.9 % (ref 0–4)
NUCLEATED RBC %: 0 %
PDW BLD-RTO: 12.6 % (ref 11.7–14.9)
PLATELET # BLD: 234 K/CU MM (ref 140–440)
PMV BLD AUTO: 10.1 FL (ref 7.5–11.1)
POTASSIUM SERPL-SCNC: 3.7 MMOL/L (ref 3.5–5.1)
PROTHROMBIN TIME: 13.3 SECONDS (ref 11.7–14.5)
RBC # BLD: 4.98 M/CU MM (ref 4.6–6.2)
SEGMENTED NEUTROPHILS ABSOLUTE COUNT: 6.5 K/CU MM
SEGMENTED NEUTROPHILS RELATIVE PERCENT: 78.6 % (ref 36–66)
SODIUM BLD-SCNC: 137 MMOL/L (ref 135–145)
T4 FREE SERPL-MCNC: 1.17 NG/DL (ref 0.9–1.8)
TOTAL IMMATURE NEUTOROPHIL: 0.02 K/CU MM
TOTAL NUCLEATED RBC: 0 K/CU MM
TOTAL PROTEIN: 7.2 GM/DL (ref 6.4–8.2)
TRIGL SERPL-MCNC: 78 MG/DL
TROPONIN, HIGH SENSITIVITY: 16 NG/L (ref 0–22)
TSH SERPL DL<=0.005 MIU/L-ACNC: 0.75 UIU/ML (ref 0.27–4.2)
WBC # BLD: 8.2 K/CU MM (ref 4–10.5)

## 2024-03-24 PROCEDURE — 70498 CT ANGIOGRAPHY NECK: CPT

## 2024-03-24 PROCEDURE — 93010 ELECTROCARDIOGRAM REPORT: CPT | Performed by: INTERNAL MEDICINE

## 2024-03-24 PROCEDURE — 80061 LIPID PANEL: CPT

## 2024-03-24 PROCEDURE — 99285 EMERGENCY DEPT VISIT HI MDM: CPT

## 2024-03-24 PROCEDURE — 6360000004 HC RX CONTRAST MEDICATION: Performed by: EMERGENCY MEDICINE

## 2024-03-24 PROCEDURE — 82962 GLUCOSE BLOOD TEST: CPT

## 2024-03-24 PROCEDURE — 97116 GAIT TRAINING THERAPY: CPT

## 2024-03-24 PROCEDURE — 36415 COLL VENOUS BLD VENIPUNCTURE: CPT

## 2024-03-24 PROCEDURE — 94761 N-INVAS EAR/PLS OXIMETRY MLT: CPT

## 2024-03-24 PROCEDURE — 70551 MRI BRAIN STEM W/O DYE: CPT

## 2024-03-24 PROCEDURE — 97162 PT EVAL MOD COMPLEX 30 MIN: CPT

## 2024-03-24 PROCEDURE — 84484 ASSAY OF TROPONIN QUANT: CPT

## 2024-03-24 PROCEDURE — 76536 US EXAM OF HEAD AND NECK: CPT

## 2024-03-24 PROCEDURE — 93005 ELECTROCARDIOGRAM TRACING: CPT | Performed by: EMERGENCY MEDICINE

## 2024-03-24 PROCEDURE — G0378 HOSPITAL OBSERVATION PER HR: HCPCS

## 2024-03-24 PROCEDURE — 2580000003 HC RX 258: Performed by: STUDENT IN AN ORGANIZED HEALTH CARE EDUCATION/TRAINING PROGRAM

## 2024-03-24 PROCEDURE — 6370000000 HC RX 637 (ALT 250 FOR IP): Performed by: STUDENT IN AN ORGANIZED HEALTH CARE EDUCATION/TRAINING PROGRAM

## 2024-03-24 PROCEDURE — 97535 SELF CARE MNGMENT TRAINING: CPT

## 2024-03-24 PROCEDURE — 2580000003 HC RX 258: Performed by: EMERGENCY MEDICINE

## 2024-03-24 PROCEDURE — 86800 THYROGLOBULIN ANTIBODY: CPT

## 2024-03-24 PROCEDURE — 6370000000 HC RX 637 (ALT 250 FOR IP): Performed by: INTERNAL MEDICINE

## 2024-03-24 PROCEDURE — 85610 PROTHROMBIN TIME: CPT

## 2024-03-24 PROCEDURE — 83036 HEMOGLOBIN GLYCOSYLATED A1C: CPT

## 2024-03-24 PROCEDURE — 80053 COMPREHEN METABOLIC PANEL: CPT

## 2024-03-24 PROCEDURE — 84439 ASSAY OF FREE THYROXINE: CPT

## 2024-03-24 PROCEDURE — 6360000002 HC RX W HCPCS: Performed by: STUDENT IN AN ORGANIZED HEALTH CARE EDUCATION/TRAINING PROGRAM

## 2024-03-24 PROCEDURE — 96372 THER/PROPH/DIAG INJ SC/IM: CPT

## 2024-03-24 PROCEDURE — 70450 CT HEAD/BRAIN W/O DYE: CPT

## 2024-03-24 PROCEDURE — 71045 X-RAY EXAM CHEST 1 VIEW: CPT

## 2024-03-24 PROCEDURE — 97165 OT EVAL LOW COMPLEX 30 MIN: CPT

## 2024-03-24 PROCEDURE — 86376 MICROSOMAL ANTIBODY EACH: CPT

## 2024-03-24 PROCEDURE — 85025 COMPLETE CBC W/AUTO DIFF WBC: CPT

## 2024-03-24 PROCEDURE — 84443 ASSAY THYROID STIM HORMONE: CPT

## 2024-03-24 RX ORDER — INSULIN GLARGINE 100 [IU]/ML
15 INJECTION, SOLUTION SUBCUTANEOUS NIGHTLY
Status: DISCONTINUED | OUTPATIENT
Start: 2024-03-24 | End: 2024-03-24

## 2024-03-24 RX ORDER — DEXTROSE MONOHYDRATE 100 MG/ML
INJECTION, SOLUTION INTRAVENOUS CONTINUOUS PRN
Status: DISCONTINUED | OUTPATIENT
Start: 2024-03-24 | End: 2024-03-25 | Stop reason: HOSPADM

## 2024-03-24 RX ORDER — ONDANSETRON 2 MG/ML
4 INJECTION INTRAMUSCULAR; INTRAVENOUS EVERY 6 HOURS PRN
Status: DISCONTINUED | OUTPATIENT
Start: 2024-03-24 | End: 2024-03-25 | Stop reason: HOSPADM

## 2024-03-24 RX ORDER — ASPIRIN 81 MG/1
324 TABLET, CHEWABLE ORAL ONCE
Status: COMPLETED | OUTPATIENT
Start: 2024-03-24 | End: 2024-03-24

## 2024-03-24 RX ORDER — INSULIN LISPRO 100 [IU]/ML
0-4 INJECTION, SOLUTION INTRAVENOUS; SUBCUTANEOUS NIGHTLY
Status: DISCONTINUED | OUTPATIENT
Start: 2024-03-24 | End: 2024-03-24

## 2024-03-24 RX ORDER — ONDANSETRON 4 MG/1
4 TABLET, ORALLY DISINTEGRATING ORAL EVERY 8 HOURS PRN
Status: DISCONTINUED | OUTPATIENT
Start: 2024-03-24 | End: 2024-03-25 | Stop reason: HOSPADM

## 2024-03-24 RX ORDER — INSULIN LISPRO 100 [IU]/ML
0-4 INJECTION, SOLUTION INTRAVENOUS; SUBCUTANEOUS
Status: DISCONTINUED | OUTPATIENT
Start: 2024-03-24 | End: 2024-03-25 | Stop reason: HOSPADM

## 2024-03-24 RX ORDER — SODIUM CHLORIDE 9 MG/ML
INJECTION, SOLUTION INTRAVENOUS PRN
Status: DISCONTINUED | OUTPATIENT
Start: 2024-03-24 | End: 2024-03-25 | Stop reason: HOSPADM

## 2024-03-24 RX ORDER — GLUCAGON 1 MG/ML
1 KIT INJECTION PRN
Status: DISCONTINUED | OUTPATIENT
Start: 2024-03-24 | End: 2024-03-25 | Stop reason: HOSPADM

## 2024-03-24 RX ORDER — ASPIRIN 300 MG/1
300 SUPPOSITORY RECTAL DAILY
Status: DISCONTINUED | OUTPATIENT
Start: 2024-03-24 | End: 2024-03-24

## 2024-03-24 RX ORDER — ASPIRIN 81 MG/1
81 TABLET, CHEWABLE ORAL DAILY
Status: DISCONTINUED | OUTPATIENT
Start: 2024-03-24 | End: 2024-03-24

## 2024-03-24 RX ORDER — ASPIRIN 81 MG/1
81 TABLET, CHEWABLE ORAL DAILY
Status: DISCONTINUED | OUTPATIENT
Start: 2024-03-25 | End: 2024-03-25 | Stop reason: HOSPADM

## 2024-03-24 RX ORDER — ENOXAPARIN SODIUM 100 MG/ML
40 INJECTION SUBCUTANEOUS DAILY
Status: DISCONTINUED | OUTPATIENT
Start: 2024-03-24 | End: 2024-03-25 | Stop reason: HOSPADM

## 2024-03-24 RX ORDER — INSULIN LISPRO 100 [IU]/ML
0-8 INJECTION, SOLUTION INTRAVENOUS; SUBCUTANEOUS
Status: DISCONTINUED | OUTPATIENT
Start: 2024-03-24 | End: 2024-03-25 | Stop reason: HOSPADM

## 2024-03-24 RX ORDER — INSULIN LISPRO 100 [IU]/ML
5 INJECTION, SOLUTION INTRAVENOUS; SUBCUTANEOUS
Status: DISCONTINUED | OUTPATIENT
Start: 2024-03-24 | End: 2024-03-24

## 2024-03-24 RX ORDER — SODIUM CHLORIDE 0.9 % (FLUSH) 0.9 %
5-40 SYRINGE (ML) INJECTION PRN
Status: DISCONTINUED | OUTPATIENT
Start: 2024-03-24 | End: 2024-03-25 | Stop reason: HOSPADM

## 2024-03-24 RX ORDER — SODIUM CHLORIDE 0.9 % (FLUSH) 0.9 %
5-40 SYRINGE (ML) INJECTION EVERY 12 HOURS SCHEDULED
Status: DISCONTINUED | OUTPATIENT
Start: 2024-03-24 | End: 2024-03-25 | Stop reason: HOSPADM

## 2024-03-24 RX ORDER — INSULIN LISPRO 100 [IU]/ML
10 INJECTION, SOLUTION INTRAVENOUS; SUBCUTANEOUS
Status: DISCONTINUED | OUTPATIENT
Start: 2024-03-24 | End: 2024-03-25 | Stop reason: HOSPADM

## 2024-03-24 RX ORDER — INSULIN GLARGINE 100 [IU]/ML
25 INJECTION, SOLUTION SUBCUTANEOUS NIGHTLY
Status: DISCONTINUED | OUTPATIENT
Start: 2024-03-24 | End: 2024-03-25 | Stop reason: HOSPADM

## 2024-03-24 RX ORDER — SODIUM CHLORIDE 0.9 % (FLUSH) 0.9 %
5-40 SYRINGE (ML) INJECTION 2 TIMES DAILY
Status: DISCONTINUED | OUTPATIENT
Start: 2024-03-24 | End: 2024-03-25 | Stop reason: HOSPADM

## 2024-03-24 RX ORDER — ATORVASTATIN CALCIUM 40 MG/1
80 TABLET, FILM COATED ORAL NIGHTLY
Status: DISCONTINUED | OUTPATIENT
Start: 2024-03-24 | End: 2024-03-25 | Stop reason: HOSPADM

## 2024-03-24 RX ORDER — INSULIN LISPRO 100 [IU]/ML
0-8 INJECTION, SOLUTION INTRAVENOUS; SUBCUTANEOUS
Status: DISCONTINUED | OUTPATIENT
Start: 2024-03-24 | End: 2024-03-25

## 2024-03-24 RX ORDER — LABETALOL HYDROCHLORIDE 5 MG/ML
10 INJECTION, SOLUTION INTRAVENOUS EVERY 10 MIN PRN
Status: DISCONTINUED | OUTPATIENT
Start: 2024-03-24 | End: 2024-03-25 | Stop reason: HOSPADM

## 2024-03-24 RX ADMIN — INSULIN LISPRO 10 UNITS: 100 INJECTION, SOLUTION INTRAVENOUS; SUBCUTANEOUS at 18:20

## 2024-03-24 RX ADMIN — INSULIN LISPRO 2 UNITS: 100 INJECTION, SOLUTION INTRAVENOUS; SUBCUTANEOUS at 13:57

## 2024-03-24 RX ADMIN — SODIUM CHLORIDE, PRESERVATIVE FREE 10 ML: 5 INJECTION INTRAVENOUS at 09:37

## 2024-03-24 RX ADMIN — INSULIN GLARGINE 25 UNITS: 100 INJECTION, SOLUTION SUBCUTANEOUS at 20:43

## 2024-03-24 RX ADMIN — SODIUM CHLORIDE, PRESERVATIVE FREE 20 ML: 5 INJECTION INTRAVENOUS at 20:46

## 2024-03-24 RX ADMIN — ENOXAPARIN SODIUM 40 MG: 100 INJECTION SUBCUTANEOUS at 09:37

## 2024-03-24 RX ADMIN — INSULIN LISPRO 2 UNITS: 100 INJECTION, SOLUTION INTRAVENOUS; SUBCUTANEOUS at 09:41

## 2024-03-24 RX ADMIN — ASPIRIN 324 MG: 81 TABLET, CHEWABLE ORAL at 09:36

## 2024-03-24 RX ADMIN — INSULIN LISPRO 5 UNITS: 100 INJECTION, SOLUTION INTRAVENOUS; SUBCUTANEOUS at 09:37

## 2024-03-24 RX ADMIN — SODIUM CHLORIDE, PRESERVATIVE FREE 10 ML: 5 INJECTION INTRAVENOUS at 20:46

## 2024-03-24 RX ADMIN — ATORVASTATIN CALCIUM 80 MG: 40 TABLET, FILM COATED ORAL at 20:43

## 2024-03-24 RX ADMIN — INSULIN LISPRO 5 UNITS: 100 INJECTION, SOLUTION INTRAVENOUS; SUBCUTANEOUS at 13:57

## 2024-03-24 RX ADMIN — IOPAMIDOL 75 ML: 755 INJECTION, SOLUTION INTRAVENOUS at 04:51

## 2024-03-24 ASSESSMENT — LIFESTYLE VARIABLES
HOW OFTEN DO YOU HAVE A DRINK CONTAINING ALCOHOL: NEVER
HOW MANY STANDARD DRINKS CONTAINING ALCOHOL DO YOU HAVE ON A TYPICAL DAY: PATIENT DOES NOT DRINK

## 2024-03-24 NOTE — ED NOTES
ED TO INPATIENT SBAR HANDOFF    Patient Name: Maurice Meza   :  1970  53 y.o.   Preferred Name    Family/Caregiver Present no   Restraints no   C-SSRS: Risk of Suicide: No Risk  Sitter no   Sepsis Risk Score Sepsis Risk Score: 1.05      Situation  Chief Complaint   Patient presents with    Extremity Weakness     Right arm weakness       Brief Description of Patient's Condition: Pt came to the ED with right arm weakness. LKW was 2pm yesterday. Stroke alert was called initially, but no interventions were done. Pt being admitted for stroke work up.   Mental Status: oriented, alert, coherent, logical, and thought processes intact  Arrived from: home    Imaging:   XR CHEST PORTABLE   Final Result   Impression:   1. No acute cardiopulmonary disease.      Electronically signed by Samir Henry MD      CTA HEAD NECK W CONTRAST   Final Result      1. Patent carotid and vertebral arteries without evidence of stenosis or acute abnormality.   2. Nonspecific multinodular goiter with a 2.6 cm dominant right lobe nodule. This is nonspecific and could be further evaluated with a follow-up elective thyroid ultrasound.      CTA HEAD:      FINDINGS:      ANTERIOR CIRCULATION: The intracranial internal carotid arteries, anterior cerebral arteries, and middle cerebral arteries demonstrate no occlusion or stenosis. No evidence for aneurysm or arteriovenous malformation.      POSTERIOR CIRCULATION: The bilateral vertebral arteries, basilar artery and posterior cerebral arteries demonstrate no occlusion or stenosis. No evidence for aneurysm or arteriovenous malformation.      INTRACRANIAL VENOUS SYSTEM: No evidence for intracranial venous thrombosis.      INTRACRANIAL HEMORRHAGE: None.      VENTRICLES: Normal in size and configuration for age.      BRAIN PARENCHYMA: Gray-white matter differentiation is normal. No intracranial mass effect.      SKULL: No destructive osseous process or fracture.      PARANASAL SINUSES / MASTOIDS:

## 2024-03-24 NOTE — CONSULTS
Diagnosis    Neck mass    Hyperosmolar hyperglycemic state (HHS) (HCC)    Stroke-like symptoms         RECOMMENDATIONS:      Reviewed POC blood glucose . Labs and X ray results   Reviewed Home and Current Medicines   Will Start On meal/ Correction bolus Humalog/ Lantus Insulin regime /    Monitor Blood glucose frequently   Modify  the dose of Insulin  as needed   To do thyroid studies including free T4 TSH, antithyroid antibodies and ultrasound of thyroid gland       Will follow with you  Again thank you for sharing pt's care with me.     Truly yours,       TUAN Lopez MD

## 2024-03-24 NOTE — ED PROVIDER NOTES
differentiation is normal. No intracranial mass effect.      SKULL: No destructive osseous process or fracture.      PARANASAL SINUSES / MASTOIDS: No acute sinusitis or mastoiditis.      ORBITS: Normal.      EXTRACRANIAL SOFT TISSUES: Normal.      OTHER: None.      IMPRESSION:      1. Patent intracranial vessels.      Electronically signed by Samir Henry MD      CT HEAD WO CONTRAST   Final Result      1. No acute intracranial findings.      Electronically signed by Samir Henry MD            ED BEDSIDE ULTRASOUND:   Performed by ED Physician Ashli Capone MD       LABS:  Labs Reviewed   CBC WITH AUTO DIFFERENTIAL - Abnormal; Notable for the following components:       Result Value    Segs Relative 78.6 (*)     Lymphocytes % 15.1 (*)     Monocytes % 4.9 (*)     All other components within normal limits   COMPREHENSIVE METABOLIC PANEL W/ REFLEX TO MG FOR LOW K - Abnormal; Notable for the following components:    Glucose 400 (*)     Creatinine 0.7 (*)     AST 13 (*)     All other components within normal limits   POCT GLUCOSE - Abnormal; Notable for the following components:    POC Glucose 327 (*)     All other components within normal limits   TROPONIN   PROTIME-INR   POCT GLUCOSE       All other labs were within normal range or not returned as of this dictation.    EMERGENCY DEPARTMENT COURSE and DIFFERENTIAL DIAGNOSIS/MDM:   Vitals:    Vitals:    03/24/24 0405 03/24/24 0449   BP: 135/80 131/77   Pulse: (!) 101 81   Resp: 17 21   Temp: 98 °F (36.7 °C)    SpO2: 98%            MDM  Number of Diagnoses or Management Options  Hyperglycemia  Right arm weakness  Stroke-like symptoms  Diagnosis management comments: 53-year-old male presents for right arm weakness and paresthesias.  He states that symptoms started at about 2:00 yesterday afternoon.  This is within 24 hours.  He states that he went to sleep about 1 PM yesterday and his normal.  He woke up with a sensation of heaviness with his right arm.  Also

## 2024-03-24 NOTE — H&P
V2.0  History and Physical      Name:  Maurice Meza /Age/Sex: 1970  (53 y.o. male)   MRN & CSN:  [id number] & [id number] Encounter Date/Time: 3/24/2024 6:05 AM EDT   Location:  [place] [address] PCP: Dariana Kim MD       Assessment and Plan:   Maurice Meza is a 53 y.o. male with T2DM presented from home with right arm heaviness.    Stroke like symptoms, r/o Acute CVA  LKN 1430  and NIH 1 on arrival. CT brain and CTA head and neck reviewed, negative for hemorrhage or significant LVO. Discussed with [facility] neurologist not a candidate for TNK due to time of onset.   Aspirin 324 mg once, followed by Aspirin and lipitor  NIHSS and neurochecks per stroke protocol, permissive hypertension  A1c and lipid panel  MRI brain without contrast    T2DM with hyperglycemia  A1c 16.0% on   Non adherent to home medications  Basal bolus regimen and sliding scale insulin  Hypoglycemia protocol    Multinodular goiter  TSH with reflex  Recommend outpatient thyroid USG    Tobacco dependence  Counseled on cessation    Obesity/BMI 36.60    Observation, Med-Surg with telemetry  Full code    Disposition:     Current Living situation: Home  Expected Disposition: Home  Estimated D/C: 2 days    Diet ADULT DIET; Regular; 4 carb choices (60 gm/meal)   DVT Prophylaxis [x] Lovenox, []  Heparin, [] SCDs, [] Ambulation,  [] Eliquis, [] Xarelto, [] Coumadin   Code Status Full Code   Surrogate Decision Maker/ POA None mentioned     Personally reviewed Lab Studies and Imaging   Discussed management of the case with ER provider who recommended admission for stroke workup  EKG interpreted personally and results NSR 79/min, no acute ST-T wave abnormalities    History from:     Patient     History of Present Illness:     Chief Complaint: Right arm heaviness    Maurice Meza is a 53 y.o. male with T2DM presented from home with right arm heaviness. Patient reports that he started having right arm heaviness and numbness

## 2024-03-25 ENCOUNTER — APPOINTMENT (OUTPATIENT)
Dept: NON INVASIVE DIAGNOSTICS | Age: 54
End: 2024-03-25

## 2024-03-25 VITALS
DIASTOLIC BLOOD PRESSURE: 75 MMHG | TEMPERATURE: 97.6 F | OXYGEN SATURATION: 100 % | BODY MASS INDEX: 30.06 KG/M2 | WEIGHT: 210 LBS | HEIGHT: 70 IN | HEART RATE: 79 BPM | SYSTOLIC BLOOD PRESSURE: 127 MMHG | RESPIRATION RATE: 21 BRPM

## 2024-03-25 LAB
ECHO AO ARCH DIAM: 3.5 CM
ECHO AO ROOT DIAM: 3.8 CM
ECHO AO ROOT INDEX: 1.78 CM/M2
ECHO AV AREA PEAK VELOCITY: 2.8 CM2
ECHO AV AREA VTI: 2.8 CM2
ECHO AV AREA/BSA PEAK VELOCITY: 1.3 CM2/M2
ECHO AV AREA/BSA VTI: 1.3 CM2/M2
ECHO AV MEAN GRADIENT: 4 MMHG
ECHO AV MEAN VELOCITY: 0.9 M/S
ECHO AV PEAK GRADIENT: 7 MMHG
ECHO AV PEAK VELOCITY: 1.3 M/S
ECHO AV VELOCITY RATIO: 0.69
ECHO AV VTI: 21.8 CM
ECHO BSA: 2.17 M2
ECHO LA AREA 4C: 18 CM2
ECHO LA DIAMETER INDEX: 1.78 CM/M2
ECHO LA DIAMETER: 3.8 CM
ECHO LA MAJOR AXIS: 4.2 CM
ECHO LA TO AORTIC ROOT RATIO: 1
ECHO LA VOL MOD A4C: 52 ML (ref 18–58)
ECHO LA VOLUME INDEX MOD A4C: 24 ML/M2 (ref 16–34)
ECHO LV E' LATERAL VELOCITY: 7 CM/S
ECHO LV E' SEPTAL VELOCITY: 7 CM/S
ECHO LV EDV A4C: 127 ML
ECHO LV EDV INDEX A4C: 60 ML/M2
ECHO LV EJECTION FRACTION A4C: 47 %
ECHO LV ESV A4C: 67 ML
ECHO LV ESV INDEX A4C: 31 ML/M2
ECHO LV FRACTIONAL SHORTENING: 37 % (ref 28–44)
ECHO LV INTERNAL DIMENSION DIASTOLE INDEX: 2.39 CM/M2
ECHO LV INTERNAL DIMENSION DIASTOLIC: 5.1 CM (ref 4.2–5.9)
ECHO LV INTERNAL DIMENSION SYSTOLIC INDEX: 1.5 CM/M2
ECHO LV INTERNAL DIMENSION SYSTOLIC: 3.2 CM
ECHO LV IVSD: 1.2 CM (ref 0.6–1)
ECHO LV MASS 2D: 241.2 G (ref 88–224)
ECHO LV MASS INDEX 2D: 113.3 G/M2 (ref 49–115)
ECHO LV POSTERIOR WALL DIASTOLIC: 1.2 CM (ref 0.6–1)
ECHO LV RELATIVE WALL THICKNESS RATIO: 0.47
ECHO LVOT AREA: 4.2 CM2
ECHO LVOT AV VTI INDEX: 0.67
ECHO LVOT DIAM: 2.3 CM
ECHO LVOT MEAN GRADIENT: 2 MMHG
ECHO LVOT PEAK GRADIENT: 3 MMHG
ECHO LVOT PEAK VELOCITY: 0.9 M/S
ECHO LVOT STROKE VOLUME INDEX: 28.7 ML/M2
ECHO LVOT SV: 61 ML
ECHO LVOT VTI: 14.7 CM
ECHO MV A VELOCITY: 0.64 M/S
ECHO MV E DECELERATION TIME (DT): 197 MS
ECHO MV E VELOCITY: 0.68 M/S
ECHO MV E/A RATIO: 1.06
ECHO MV E/E' LATERAL: 9.71
ECHO MV E/E' RATIO (AVERAGED): 9.71
ECHO RV MID DIMENSION: 2.7 CM
ECHO TV REGURGITANT MAX VELOCITY: 1.87 M/S
ECHO TV REGURGITANT PEAK GRADIENT: 14 MMHG
GLUCOSE BLD-MCNC: 177 MG/DL (ref 70–99)
GLUCOSE BLD-MCNC: 197 MG/DL (ref 70–99)
GLUCOSE BLD-MCNC: 207 MG/DL (ref 70–99)
GLUCOSE BLD-MCNC: 261 MG/DL (ref 70–99)

## 2024-03-25 PROCEDURE — 6370000000 HC RX 637 (ALT 250 FOR IP): Performed by: STUDENT IN AN ORGANIZED HEALTH CARE EDUCATION/TRAINING PROGRAM

## 2024-03-25 PROCEDURE — 2580000003 HC RX 258: Performed by: STUDENT IN AN ORGANIZED HEALTH CARE EDUCATION/TRAINING PROGRAM

## 2024-03-25 PROCEDURE — G0378 HOSPITAL OBSERVATION PER HR: HCPCS

## 2024-03-25 PROCEDURE — 93306 TTE W/DOPPLER COMPLETE: CPT

## 2024-03-25 PROCEDURE — 6370000000 HC RX 637 (ALT 250 FOR IP): Performed by: INTERNAL MEDICINE

## 2024-03-25 PROCEDURE — 93306 TTE W/DOPPLER COMPLETE: CPT | Performed by: INTERNAL MEDICINE

## 2024-03-25 PROCEDURE — 82962 GLUCOSE BLOOD TEST: CPT

## 2024-03-25 RX ORDER — INSULIN GLARGINE 100 [IU]/ML
20 INJECTION, SOLUTION SUBCUTANEOUS NIGHTLY
Qty: 2 ADJUSTABLE DOSE PRE-FILLED PEN SYRINGE | Refills: 0 | Status: SHIPPED | OUTPATIENT
Start: 2024-03-25

## 2024-03-25 RX ORDER — ATORVASTATIN CALCIUM 40 MG/1
40 TABLET, FILM COATED ORAL NIGHTLY
Qty: 30 TABLET | Refills: 0 | Status: SHIPPED | OUTPATIENT
Start: 2024-03-25 | End: 2024-04-24

## 2024-03-25 RX ORDER — ASPIRIN 81 MG/1
81 TABLET, CHEWABLE ORAL DAILY
Qty: 30 TABLET | Refills: 0 | Status: SHIPPED | OUTPATIENT
Start: 2024-03-26 | End: 2024-03-25

## 2024-03-25 RX ORDER — ATORVASTATIN CALCIUM 40 MG/1
40 TABLET, FILM COATED ORAL NIGHTLY
Qty: 30 TABLET | Refills: 0 | Status: SHIPPED | OUTPATIENT
Start: 2024-03-25 | End: 2024-03-25

## 2024-03-25 RX ORDER — ASPIRIN 81 MG/1
81 TABLET, CHEWABLE ORAL DAILY
Qty: 30 TABLET | Refills: 0 | Status: SHIPPED | OUTPATIENT
Start: 2024-03-26

## 2024-03-25 RX ADMIN — INSULIN LISPRO 10 UNITS: 100 INJECTION, SOLUTION INTRAVENOUS; SUBCUTANEOUS at 09:44

## 2024-03-25 RX ADMIN — SODIUM CHLORIDE, PRESERVATIVE FREE 10 ML: 5 INJECTION INTRAVENOUS at 09:44

## 2024-03-25 RX ADMIN — ASPIRIN 81 MG: 81 TABLET, CHEWABLE ORAL at 09:44

## 2024-03-25 RX ADMIN — INSULIN LISPRO 10 UNITS: 100 INJECTION, SOLUTION INTRAVENOUS; SUBCUTANEOUS at 13:17

## 2024-03-25 RX ADMIN — INSULIN LISPRO 2 UNITS: 100 INJECTION, SOLUTION INTRAVENOUS; SUBCUTANEOUS at 13:17

## 2024-03-25 ASSESSMENT — PAIN SCALES - GENERAL: PAINLEVEL_OUTOF10: 0

## 2024-03-25 NOTE — DISCHARGE SUMMARY
V2.0  Discharge Summary    Name:  Maurice Meza /Age/Sex: 1970 (53 y.o. male)   Admit Date: 3/24/2024  Discharge Date: 3/25/24    MRN & CSN:  [id number] & [id number] Encounter Date and Time 3/25/24 11:16 AM EDT    Attending:  Joss Antonio MD Discharging Provider: Serena Garcia APRN - CNP       Hospital Course:     Brief HPI: Maurice Meza is a 53 y.o. male who presented with right arm weakness and paresthesias. His LKW was 3/24/2024 at 1430. He endorses right hand weakness that felt like it was asleep.     Brief Problem Based Course:     Stroke like symptoms, r/o Acute CVA  LKN 1430  and NIH 1 on arrival. CT brain and CTA head and neck reviewed, negative for hemorrhage or significant LVO. Stroke alert called. Discussed with [facility] neurologist not a candidate for TNK due to time of onset. Aspirin 324 mg once, followed by Aspirin and lipitor.  NIHSS and neurochecks per stroke protocol, permissive hypertension  MRI brain without contrast negative for acute stroke  Lipid panel unremarkable  Echo shows normal EF, No wall motion     T2DM with hyperglycemia  A1c 16.0% on , rechecked 3/24: 11.7  Continue home regimen     Multinodular goiter  TSH with reflex: 0.746  Free T4: 1.17  Dr. Lopez consulted on 3/24, received thyroid US final results pending,  follow up with endocrinology outpatient       Tobacco dependence  Counseled on cessation     Obesity/BMI 36.60        The patient expressed appropriate understanding of, and agreement with the discharge recommendations, medications, and plan.     Consults this admission:  IP CONSULT TO ENDOCRINOLOGY    Discharge Diagnosis:   Stroke-like symptoms      Discharge Instruction:   Follow up appointments: PCP, endocrinology  Primary care physician: Dariana Kim MD within 2 weeks  Diet: diabetic diet   Activity: activity as tolerated  Disposition: Discharged to:   [x]Home, []HHC, []SNF, []Acute Rehab, []Other Acute Care Facility, []Hospice

## 2024-03-25 NOTE — CARE COORDINATION
Received referral from CM pt may need assistance with medication cost(s) at time of discharge. Will follow pt as requested.

## 2024-03-25 NOTE — PROGRESS NOTES
Patient resting quietly eating breakfast. Still reports right-sided weakness. MRI pending.  A1c 11.7.  PT/OT pending.  History of nonadherence.  Endo consult.  Possible DC in a.m.  
  Progress Note( Dr. Lopez)  3/25/2024  Subjective:   Admit Date: 3/24/2024  PCP: Dariana Kim MD    Admitted For :  Right arm feeling of heaviness and paresthesia     Consulted For: better control of Blood glucose     Interval History: feels better however right arm stil has mils paresthesia     Denies any chest pains,   Denies SOB .   Denies nausea or vomiting.   No new bowel or bladder symptoms.     No intake or output data in the 24 hours ending 03/25/24 0831    DATA    CBC:   Recent Labs     03/24/24 0422   WBC 8.2   HGB 14.4       CMP:  Recent Labs     03/24/24 0422      K 3.7      CO2 23   BUN 8   CREATININE 0.7*   CALCIUM 8.8   PROT 7.2   LABALBU 4.2   BILITOT 0.6   ALKPHOS 110   AST 13*   ALT 27     Lipids:   Lab Results   Component Value Date/Time    CHOL 147 03/24/2024 04:22 AM    HDL 49 03/24/2024 04:22 AM    TRIG 78 03/24/2024 04:22 AM     Glucose:  Recent Labs     03/24/24 2037 03/25/24  0229 03/25/24  0748   POCGLU 229* 197* 177*     [id number]:  Lab Results   Component Value Date/Time    LABA1C 11.7 03/24/2024 04:22 AM     High Sensitivity TSH:   Lab Results   Component Value Date/Time    TSHHS 0.746 03/24/2024 04:22 AM     Free T3: No results found for: \"FT3\"  Free T4:  Lab Results   Component Value Date/Time    T4FREE 1.17 03/24/2024 09:06 PM       MRI brain without contrast   Final Result      No evidence of acute intracranial pathology.            Electronically signed by MD Victor Manuel Dowling      XR CHEST PORTABLE   Final Result   Impression:   1. No acute cardiopulmonary disease.      Electronically signed by Samir Henry MD      CTA HEAD NECK W CONTRAST   Final Result      1. Patent carotid and vertebral arteries without evidence of stenosis or acute abnormality.   2. Nonspecific multinodular goiter with a 2.6 cm dominant right lobe nodule. This is nonspecific and could be further evaluated with a follow-up elective thyroid ultrasound.      CTA HEAD:    
4 Eyes Skin Assessment     NAME:  Maurice Meza  YOB: 1970  MEDICAL RECORD NUMBER:  [id number]    The patient is being assessed for  Admission    I agree that at least one RN has performed a thorough Head to Toe Skin Assessment on the patient. ALL assessment sites listed below have been assessed.      Areas assessed by both nurses:    Head, Face, Ears, Shoulders, Back, Chest, Arms, Elbows, Hands, Sacrum. Buttock, Coccyx, Ischium, Legs. Feet and Heels, and Under Medical Devices         Does the Patient have a Wound? No noted wound(s)       Atul Prevention initiated by RN: No  Wound Care Orders initiated by RN: No    Pressure Injury (Stage 3,4, Unstageable, DTI, NWPT, and Complex wounds) if present, place Wound referral order by RN under : No    New Ostomies, if present place, Ostomy referral order under : No     Nurse 1 eSignature: Electronically signed by Ángela Bernal RN on 3/24/24 at 11:47 AM EDT    **SHARE this note so that the co-signing nurse can place an eSignature**    Nurse 2 eSignature: Electronically signed by Milagros Lazar RN on 3/25/24 at 5:13 PM EDT   
Occupational Therapy  [facility] ACUTE CARE OCCUPATIONAL THERAPY EVALUATION    Maurice Meza, 1970, 3024/3024-A, 3/24/2024    Discharge Recommendation: OP OT       History:  [place]:  The primary encounter diagnosis was Right arm weakness. Diagnoses of Hyperglycemia and Stroke-like symptoms were also pertinent to this visit.  Past Medical History:   Diagnosis Date    Diabetes (HCC)     Thyroid disease     scheduled for thyroid bx  8/20/12r/t(  has a mass right side of neck)       Subjective:  Patient states: \"I fed myself with this hand but I wouldn't say it went well\"  Pain:  denies  Communication with other providers: co-eval w/ PT, handoff to RN  Restrictions: General Precautions, Fall Risk    Home Setup/Prior level of function:  Social/Functional History  Lives With: Friend(s)  Type of Home: Trailer  Home Layout: One level  Home Access: Stairs to enter with rails  Entrance Stairs - Number of Steps: 3  Bathroom Shower/Tub: Tub/Shower unit  Bathroom Toilet: Standard  ADL Assistance: Independent  Ambulation Assistance: Independent  Transfer Assistance: Independent  Active : Yes  Occupation: Full time employment  Type of Occupation: [employer]     Examination:  Observation: Supine in bed upon arrival, agreeable to therapy eval.  Vision: WFL  Hearing: WFL  Vitals: Stable vitals throughout session on room air      Body Systems and functions:  ROM: B UE WFL   Strength: B UE grossly 4+/5 across all major joints   Sensation: reports B UE WFL  Tone: Normal  Coordination: L UE WFL, R UE GM/FM coordination F+ with inc effort   Perception: WNL      Cognitive and Psychosocial Functioning:  Overall cognitive status: alert and oriented, WFL  Affect: Normal       Functional Mobility:  Bed mobility:  supine to sitting EOB w/ supervision  Sitting balance:  supervision    Transfers: STS from EOB and to recliner w/ supervision  Standing balance:  SBA - supervision w/o AD  Functional Mobility: ambulated 
Outpatient Pharmacy Progress Note for Meds-to-Beds      Additional Documentation:  Patient stated that he will return to the pharmacy tomorrow morning to pick-up his medications, including Lantus. Patient does not get paid until tomorrow and has no money with him in the hospital. Will reach-out to [facility] [employer] to see if they can help.       Thank you for letting us serve your patients.  [place] [id number]    Phone: [phone]    Fax: [phone]        
Precautions     Subjective   General  Chart Reviewed: Yes  Patient assessed for rehabilitation services?: Yes  Family / Caregiver Present: No  Follows Commands: Within Functional Limits  Subjective  Subjective: pain: denies; 0/10         Social/Functional History  Social/Functional History  Lives With: Friend(s)  Type of Home: Trailer  Home Layout: One level  Home Access: Stairs to enter with rails  Entrance Stairs - Number of Steps: 3  Bathroom Shower/Tub: Tub/Shower unit  Bathroom Toilet: Standard  ADL Assistance: Independent  Ambulation Assistance: Independent  Transfer Assistance: Independent  Active : Yes  Occupation: Full time employment  Type of Occupation: [employer]  Vision/Hearing  Vision  Vision: Within Functional Limits  Hearing  Hearing: Within functional limits    Cognition   Orientation  Overall Orientation Status: Within Functional Limits  Cognition  Overall Cognitive Status: WFL     Objective   Pulse: 77  Heart Rate Source: Monitor  BP: 96/74  BP Location: Right upper arm  BP Method: Automatic  Patient Position: Supine  MAP (Calculated): 81  Respirations: 18  SpO2: 100 %  O2 Device: None (Room air)  Temp: 97.8 °F (36.6 °C)        Gross Assessment  Sensation: Intact (BLEs)        Strength RLE  Comment: knee extension: 4+/5, ankle DF: 5/5  Strength LLE  Comment: knee extension: 4+/5, ankle DF: 5/5  Strength RUE  Comment: impaired; see OT note for details  Strength LUE  Strength LUE: WFL           Bed mobility  Supine to Sit: Independent  Sit to Supine: Independent  Transfers  Sit to Stand: Independent  Stand to Sit: Independent  Ambulation  Surface: Level tile  Device: No Device  Assistance: Stand by assistance;Supervision  Quality of Gait: decreased gait speed, decreased step length bilaterally  Distance: 150 feet + 250 feet  Comments: with initial verbal cues for directions in order to successfully navigate hallway and return to correct room     Balance  Posture: Fair  Sitting - Static:

## 2024-03-25 NOTE — CARE COORDINATION
03/25/24 0900   Service Assessment   Patient Orientation Alert and Oriented   Cognition Alert   History Provided By Medical Record   Primary Caregiver Self   Support Systems None   Patient's Healthcare Decision Maker is: Legal Next of Kin   PCP Verified by CM Yes   Prior Functional Level Independent in ADLs/IADLs   Current Functional Level Independent in ADLs/IADLs   Can patient return to prior living arrangement Yes   Ability to make needs known: Good   Family able to assist with home care needs: No   Would you like for me to discuss the discharge plan with any other family members/significant others, and if so, who? No   Financial Resources None   Community Resources None     Chart reviewed, screened for discharge planning.  Pt from home.      Pt listed as self-pay.  Referral made to [place]/[facility]. Referral made to [facility]/[employer].     No discharge needs identified at this time.  CM following

## 2024-03-26 LAB
THYROGLOB AB SERPL-ACNC: <0.9 IU/ML (ref 0–4)
THYROPEROXIDASE AB SERPL-ACNC: 0.4 IU/ML (ref 0–9)

## 2024-03-26 NOTE — CARE COORDINATION
Contacted by [facility] pharmacy to assist with medication cost(s) at time of discharge.  Approved a 1x voucher for two medications and supplies.  Faxed voucher to [facility] pharmacy.    Added patient to the flagged list. If pt requests additional help a [facility] application must be completed and required documents provided to determine eligibility for ongoing assistance.

## 2024-08-28 ENCOUNTER — OFFICE VISIT (OUTPATIENT)
Dept: FAMILY MEDICINE CLINIC | Age: 54
End: 2024-08-28

## 2024-08-28 VITALS
RESPIRATION RATE: 16 BRPM | SYSTOLIC BLOOD PRESSURE: 120 MMHG | OXYGEN SATURATION: 98 % | WEIGHT: 223.2 LBS | DIASTOLIC BLOOD PRESSURE: 80 MMHG | BODY MASS INDEX: 33.06 KG/M2 | HEIGHT: 69 IN | HEART RATE: 87 BPM

## 2024-08-28 DIAGNOSIS — E78.00 ELEVATED CHOLESTEROL: ICD-10-CM

## 2024-08-28 DIAGNOSIS — Z13.9 ENCOUNTER FOR SCREENING INVOLVING SOCIAL DETERMINANTS OF HEALTH (SDOH): ICD-10-CM

## 2024-08-28 DIAGNOSIS — Z59.89 UNDER OR UNINSURED: ICD-10-CM

## 2024-08-28 DIAGNOSIS — F17.200 SMOKER: ICD-10-CM

## 2024-08-28 DIAGNOSIS — Z79.4 TYPE 2 DIABETES MELLITUS WITHOUT COMPLICATION, WITH LONG-TERM CURRENT USE OF INSULIN (HCC): Primary | ICD-10-CM

## 2024-08-28 DIAGNOSIS — E11.9 TYPE 2 DIABETES MELLITUS WITHOUT COMPLICATION, WITH LONG-TERM CURRENT USE OF INSULIN (HCC): Primary | ICD-10-CM

## 2024-08-28 DIAGNOSIS — Z12.11 SCREEN FOR COLON CANCER: ICD-10-CM

## 2024-08-28 PROBLEM — Z59.71 UNDER OR UNINSURED: Status: ACTIVE | Noted: 2024-08-28

## 2024-08-28 PROBLEM — R29.90 STROKE-LIKE SYMPTOMS: Status: RESOLVED | Noted: 2024-03-24 | Resolved: 2024-08-28

## 2024-08-28 PROBLEM — E11.00 HYPEROSMOLAR HYPERGLYCEMIC STATE (HHS) (HCC): Status: RESOLVED | Noted: 2021-07-05 | Resolved: 2024-08-28

## 2024-08-28 LAB — HBA1C MFR BLD: 10 %

## 2024-08-28 PROCEDURE — 3046F HEMOGLOBIN A1C LEVEL >9.0%: CPT | Performed by: NURSE PRACTITIONER

## 2024-08-28 PROCEDURE — 99204 OFFICE O/P NEW MOD 45 MIN: CPT | Performed by: NURSE PRACTITIONER

## 2024-08-28 PROCEDURE — 83036 HEMOGLOBIN GLYCOSYLATED A1C: CPT | Performed by: NURSE PRACTITIONER

## 2024-08-28 RX ORDER — SYRING-NEEDL,DISP,INSUL,0.3 ML 30 GX5/16"
1 SYRINGE, EMPTY DISPOSABLE MISCELLANEOUS 4 TIMES DAILY
Qty: 100 EACH | Refills: 3 | Status: SHIPPED | OUTPATIENT
Start: 2024-08-28

## 2024-08-28 RX ORDER — ATORVASTATIN CALCIUM 40 MG/1
40 TABLET, FILM COATED ORAL NIGHTLY
Qty: 30 TABLET | Refills: 2 | Status: SHIPPED | OUTPATIENT
Start: 2024-08-28 | End: 2024-09-27

## 2024-08-28 RX ORDER — INSULIN GLARGINE 100 [IU]/ML
20 INJECTION, SOLUTION SUBCUTANEOUS NIGHTLY
Qty: 2 ADJUSTABLE DOSE PRE-FILLED PEN SYRINGE | Refills: 2 | Status: SHIPPED | OUTPATIENT
Start: 2024-08-28

## 2024-08-28 RX ORDER — GLUCOSAMINE HCL/CHONDROITIN SU 500-400 MG
CAPSULE ORAL
Qty: 100 STRIP | Refills: 2 | Status: SHIPPED | OUTPATIENT
Start: 2024-08-28

## 2024-08-28 RX ORDER — BLOOD-GLUCOSE METER
1 KIT MISCELLANEOUS DAILY
Qty: 1 KIT | Refills: 0 | Status: SHIPPED | OUTPATIENT
Start: 2024-08-28

## 2024-08-28 RX ORDER — ASPIRIN 81 MG/1
81 TABLET, CHEWABLE ORAL DAILY
Qty: 30 TABLET | Refills: 2 | Status: SHIPPED | OUTPATIENT
Start: 2024-08-28

## 2024-08-28 RX ORDER — GLYBURIDE 5 MG/1
10 TABLET ORAL 2 TIMES DAILY WITH MEALS
Qty: 60 TABLET | Refills: 2 | Status: SHIPPED | OUTPATIENT
Start: 2024-08-28

## 2024-08-28 SDOH — ECONOMIC STABILITY - INCOME SECURITY: OTHER PROBLEMS RELATED TO HOUSING AND ECONOMIC CIRCUMSTANCES: Z59.89

## 2024-08-28 SDOH — ECONOMIC STABILITY: FOOD INSECURITY: WITHIN THE PAST 12 MONTHS, YOU WORRIED THAT YOUR FOOD WOULD RUN OUT BEFORE YOU GOT MONEY TO BUY MORE.: NEVER TRUE

## 2024-08-28 SDOH — ECONOMIC STABILITY: FOOD INSECURITY: WITHIN THE PAST 12 MONTHS, THE FOOD YOU BOUGHT JUST DIDN'T LAST AND YOU DIDN'T HAVE MONEY TO GET MORE.: NEVER TRUE

## 2024-08-28 SDOH — ECONOMIC STABILITY: INCOME INSECURITY: HOW HARD IS IT FOR YOU TO PAY FOR THE VERY BASICS LIKE FOOD, HOUSING, MEDICAL CARE, AND HEATING?: NOT HARD AT ALL

## 2024-08-28 ASSESSMENT — ENCOUNTER SYMPTOMS
CHEST TIGHTNESS: 0
GASTROINTESTINAL NEGATIVE: 1
VOMITING: 0
DIARRHEA: 0
NAUSEA: 0
ABDOMINAL DISTENTION: 0
EYES NEGATIVE: 1
RESPIRATORY NEGATIVE: 1
SHORTNESS OF BREATH: 0
CONSTIPATION: 0
COUGH: 0
EYE DISCHARGE: 0
PHOTOPHOBIA: 0
WHEEZING: 0

## 2024-08-28 ASSESSMENT — PATIENT HEALTH QUESTIONNAIRE - PHQ9
SUM OF ALL RESPONSES TO PHQ QUESTIONS 1-9: 0
1. LITTLE INTEREST OR PLEASURE IN DOING THINGS: NOT AT ALL
SUM OF ALL RESPONSES TO PHQ QUESTIONS 1-9: 0
SUM OF ALL RESPONSES TO PHQ9 QUESTIONS 1 & 2: 0
SUM OF ALL RESPONSES TO PHQ QUESTIONS 1-9: 0
SUM OF ALL RESPONSES TO PHQ QUESTIONS 1-9: 0
2. FEELING DOWN, DEPRESSED OR HOPELESS: NOT AT ALL

## 2024-08-28 NOTE — ASSESSMENT & PLAN NOTE
A1c today is 10.   Not checking bs due to no meter.  Needs an order.   Using lantus 20 units at bedtime

## 2024-08-28 NOTE — PROGRESS NOTES
times daily (with meals) 60 tablet 2    aspirin 81 MG chewable tablet Take 1 tablet by mouth daily 30 tablet 2    blood glucose monitor strips Test 3-4 times a day & as needed for symptoms of irregular blood glucose. Dispense sufficient amount for indicated testing frequency plus additional to accommodate PRN testing needs. 100 strip 2    glucose monitoring kit 1 kit by Does not apply route daily 1 kit 0    glyBURIDE (DIABETA) 5 MG tablet Take 2 tablets by mouth 2 times daily (with meals) 60 tablet 2    insulin glargine (LANTUS SOLOSTAR) 100 UNIT/ML injection pen Inject 20 Units into the skin nightly 2 Adjustable Dose Pre-filled Pen Syringe 2    Lancets (Livescribe ULTRA THIN AUTO LANCET) MISC 1 each by Does not apply route 4 times daily 100 each 3    Insulin Pen Needle ([employer] PEN NEEDLES 29G) 29G X 12MM MISC 1 each by Does not apply route daily (Patient not taking: Reported on 8/28/2024) 100 each 3     No current facility-administered medications for this visit.        Past medical, family,surgical history reviewed  Social Determinants of Health     Tobacco Use: High Risk (8/28/2024)    Patient History     Smoking Tobacco Use: Some Days     Smokeless Tobacco Use: Never     Passive Exposure: Not on file   Alcohol Use: Not At Risk (3/24/2024)    AUDIT-C     Frequency of Alcohol Consumption: Never     Average Number of Drinks: Patient does not drink     Frequency of Binge Drinking: Never   Financial Resource Strain: Low Risk  (8/28/2024)    Overall Financial Resource Strain (CARDIA)     Difficulty of Paying Living Expenses: Not hard at all   Food Insecurity: No Food Insecurity (8/28/2024)    Hunger Vital Sign     Worried About Running Out of Food in the Last Year: Never true     Ran Out of Food in the Last Year: Never true   Transportation Needs: Unmet Transportation Needs (8/28/2024)    PRAPARE - Transportation     Lack of Transportation (Medical): Yes     Lack of Transportation (Non-Medical): No   Physical Activity:  back: Normal range of motion.   Lymphadenopathy:      Cervical: No cervical adenopathy.   Skin:     General: Skin is warm and dry.      Coloration: Skin is not pale.      Findings: No rash.   Neurological:      Mental Status: He is alert and oriented to person, place, and time.   Psychiatric:         Thought Content: Thought content normal.         Judgment: Judgment normal.         Lab Results   Component Value Date    WBC 8.2 03/24/2024    HGB 14.4 03/24/2024    HCT 42.9 03/24/2024    MCV 86.1 03/24/2024     03/24/2024     Lab Results   Component Value Date     03/24/2024    K 3.7 03/24/2024     03/24/2024    CO2 23 03/24/2024    BUN 8 03/24/2024    CREATININE 0.7 (L) 03/24/2024    GLUCOSE 400 (H) 03/24/2024    CALCIUM 8.8 03/24/2024    BILITOT 0.6 03/24/2024    ALKPHOS 110 03/24/2024    AST 13 (L) 03/24/2024    ALT 27 03/24/2024    LABGLOM >60 03/24/2024    GFRAA >60 07/06/2021     Lab Results   Component Value Date    CHOL 147 03/24/2024     Lab Results   Component Value Date    TRIG 78 03/24/2024     Lab Results   Component Value Date    HDL 49 03/24/2024     No components found for: \"LDLCALC\", \"LDLCHOLESTEROL\"  Lab Results   Component Value Date    LABA1C 10.0 08/28/2024     Lab Results   Component Value Date    TSH 1.29 08/10/2012    TSHHS 0.746 03/24/2024         ASSESSMENT/PLAN:      1. Type 2 diabetes mellitus without complication, with long-term current use of insulin (HCC)  All medication refills were sent to the local pharmacy.  Patient was advised to follow-up in 3 months for a recheck of the hemoglobin A1c      - POCT glycosylated hemoglobin (Hb A1C)  - metFORMIN (GLUCOPHAGE) 1000 MG tablet; Take 1 tablet by mouth 2 times daily (with meals)  Dispense: 60 tablet; Refill: 2  - aspirin 81 MG chewable tablet; Take 1 tablet by mouth daily  Dispense: 30 tablet; Refill: 2  - blood glucose monitor strips; Test 3-4 times a day & as needed for symptoms of irregular blood glucose. Dispense

## 2024-09-10 ENCOUNTER — TELEPHONE (OUTPATIENT)
Dept: GASTROENTEROLOGY | Age: 54
End: 2024-09-10

## 2024-09-17 ENCOUNTER — TELEPHONE (OUTPATIENT)
Dept: GASTROENTEROLOGY | Age: 54
End: 2024-09-17

## 2024-09-26 ENCOUNTER — TELEPHONE (OUTPATIENT)
Dept: FAMILY MEDICINE CLINIC | Age: 54
End: 2024-09-26

## 2024-09-26 NOTE — TELEPHONE ENCOUNTER
Unable to reach patient to reschedule appointment and patient does not have vm set up to leave message. Will attempt to contact patient again later today

## 2024-09-27 ENCOUNTER — TELEPHONE (OUTPATIENT)
Dept: GASTROENTEROLOGY | Age: 54
End: 2024-09-27

## 2024-09-30 NOTE — TELEPHONE ENCOUNTER
Attempted to contact patient again to reschedule patient's appointment, but call went to  which is not set up

## 2025-01-08 ENCOUNTER — TELEPHONE (OUTPATIENT)
Dept: FAMILY MEDICINE CLINIC | Age: 55
End: 2025-01-08

## 2025-01-08 NOTE — TELEPHONE ENCOUNTER
Attempted to contact patient to confirm whether he has had a colonoscopy or cologuard performed, but attempt was unsuccessful. No vm is set up to leave a message for patient.

## 2025-03-07 ENCOUNTER — TELEPHONE (OUTPATIENT)
Dept: FAMILY MEDICINE CLINIC | Age: 55
End: 2025-03-07

## 2025-03-07 NOTE — TELEPHONE ENCOUNTER
Attempted to contact patient to schedule patient for follow up with Rose Marie, but attempt was unsuccessful. Call did not go through and I could not leave message. Messaging patient via [employer]

## 2025-04-23 ENCOUNTER — TELEPHONE (OUTPATIENT)
Dept: FAMILY MEDICINE CLINIC | Age: 55
End: 2025-04-23

## 2025-04-23 NOTE — TELEPHONE ENCOUNTER
Tried to contact patient per Rose Marie to schedule appt. Phone # we have on file is not in service. I called Sonya on contact list to have him call the office. She voiced understanding and will get the message to him.

## 2025-05-08 ENCOUNTER — TELEPHONE (OUTPATIENT)
Dept: FAMILY MEDICINE CLINIC | Age: 55
End: 2025-05-08

## 2025-05-08 NOTE — TELEPHONE ENCOUNTER
Unable to leave message per Rose Marie swain. Went straight to wireless customer not available message. Patient is needing a f/u appt and colon screening for health maintenance.

## 2025-07-01 ENCOUNTER — TELEPHONE (OUTPATIENT)
Dept: FAMILY MEDICINE CLINIC | Age: 55
End: 2025-07-01

## 2025-07-01 NOTE — TELEPHONE ENCOUNTER
Attempted to contact patient to schedule patient for a follow up appointment, but attempt was unsuccessful. Message states that wireless customer is not available and hangs up.